# Patient Record
Sex: FEMALE | Race: WHITE | NOT HISPANIC OR LATINO | Employment: OTHER | URBAN - METROPOLITAN AREA
[De-identification: names, ages, dates, MRNs, and addresses within clinical notes are randomized per-mention and may not be internally consistent; named-entity substitution may affect disease eponyms.]

---

## 2023-07-11 LAB
ALANINE AMINOTRANSFERASE (SGPT) (U/L) IN SER/PLAS: 15 U/L (ref 7–45)
ALBUMIN (G/DL) IN SER/PLAS: 4.1 G/DL (ref 3.4–5)
ALKALINE PHOSPHATASE (U/L) IN SER/PLAS: 96 U/L (ref 33–136)
ANION GAP IN SER/PLAS: 9 MMOL/L (ref 10–20)
ASPARTATE AMINOTRANSFERASE (SGOT) (U/L) IN SER/PLAS: 14 U/L (ref 9–39)
BASOPHILS (10*3/UL) IN BLOOD BY AUTOMATED COUNT: 0.03 X10E9/L (ref 0–0.1)
BASOPHILS/100 LEUKOCYTES IN BLOOD BY AUTOMATED COUNT: 0.6 % (ref 0–2)
BILIRUBIN TOTAL (MG/DL) IN SER/PLAS: 0.5 MG/DL (ref 0–1.2)
CALCIUM (MG/DL) IN SER/PLAS: 9.8 MG/DL (ref 8.6–10.3)
CARBON DIOXIDE, TOTAL (MMOL/L) IN SER/PLAS: 27 MMOL/L (ref 21–32)
CHLORIDE (MMOL/L) IN SER/PLAS: 107 MMOL/L (ref 98–107)
CHOLESTEROL (MG/DL) IN SER/PLAS: 186 MG/DL (ref 0–199)
CHOLESTEROL IN HDL (MG/DL) IN SER/PLAS: 61.6 MG/DL
CHOLESTEROL/HDL RATIO: 3
CREATININE (MG/DL) IN SER/PLAS: 0.89 MG/DL (ref 0.5–1.05)
EOSINOPHILS (10*3/UL) IN BLOOD BY AUTOMATED COUNT: 0.22 X10E9/L (ref 0–0.4)
EOSINOPHILS/100 LEUKOCYTES IN BLOOD BY AUTOMATED COUNT: 4.2 % (ref 0–6)
ERYTHROCYTE DISTRIBUTION WIDTH (RATIO) BY AUTOMATED COUNT: 13.2 % (ref 11.5–14.5)
ERYTHROCYTE MEAN CORPUSCULAR HEMOGLOBIN CONCENTRATION (G/DL) BY AUTOMATED: 32.8 G/DL (ref 32–36)
ERYTHROCYTE MEAN CORPUSCULAR VOLUME (FL) BY AUTOMATED COUNT: 96 FL (ref 80–100)
ERYTHROCYTES (10*6/UL) IN BLOOD BY AUTOMATED COUNT: 4.04 X10E12/L (ref 4–5.2)
GFR FEMALE: 69 ML/MIN/1.73M2
GLUCOSE (MG/DL) IN SER/PLAS: 94 MG/DL (ref 74–99)
HEMATOCRIT (%) IN BLOOD BY AUTOMATED COUNT: 38.7 % (ref 36–46)
HEMOGLOBIN (G/DL) IN BLOOD: 12.7 G/DL (ref 12–16)
IMMATURE GRANULOCYTES/100 LEUKOCYTES IN BLOOD BY AUTOMATED COUNT: 0.4 % (ref 0–0.9)
LDL: 114 MG/DL (ref 0–99)
LEUKOCYTES (10*3/UL) IN BLOOD BY AUTOMATED COUNT: 5.3 X10E9/L (ref 4.4–11.3)
LYMPHOCYTES (10*3/UL) IN BLOOD BY AUTOMATED COUNT: 1.72 X10E9/L (ref 0.8–3)
LYMPHOCYTES/100 LEUKOCYTES IN BLOOD BY AUTOMATED COUNT: 32.7 % (ref 13–44)
MONOCYTES (10*3/UL) IN BLOOD BY AUTOMATED COUNT: 0.57 X10E9/L (ref 0.05–0.8)
MONOCYTES/100 LEUKOCYTES IN BLOOD BY AUTOMATED COUNT: 10.8 % (ref 2–10)
NEUTROPHILS (10*3/UL) IN BLOOD BY AUTOMATED COUNT: 2.7 X10E9/L (ref 1.6–5.5)
NEUTROPHILS/100 LEUKOCYTES IN BLOOD BY AUTOMATED COUNT: 51.3 % (ref 40–80)
PLATELETS (10*3/UL) IN BLOOD AUTOMATED COUNT: 176 X10E9/L (ref 150–450)
POTASSIUM (MMOL/L) IN SER/PLAS: 3.7 MMOL/L (ref 3.5–5.3)
PROTEIN TOTAL: 6.8 G/DL (ref 6.4–8.2)
SODIUM (MMOL/L) IN SER/PLAS: 139 MMOL/L (ref 136–145)
THYROTROPIN (MIU/L) IN SER/PLAS BY DETECTION LIMIT <= 0.05 MIU/L: 3.64 MIU/L (ref 0.44–3.98)
TRIGLYCERIDE (MG/DL) IN SER/PLAS: 54 MG/DL (ref 0–149)
UREA NITROGEN (MG/DL) IN SER/PLAS: 20 MG/DL (ref 6–23)
VLDL: 11 MG/DL (ref 0–40)

## 2023-09-12 PROBLEM — I83.90 VARICOSE VEIN OF LEG: Status: ACTIVE | Noted: 2023-09-12

## 2023-09-12 PROBLEM — I10 BENIGN ESSENTIAL HYPERTENSION: Status: ACTIVE | Noted: 2023-09-12

## 2023-09-12 PROBLEM — R26.89 BALANCE PROBLEM: Status: ACTIVE | Noted: 2023-09-12

## 2023-09-12 PROBLEM — F41.0 PANIC ATTACK: Status: ACTIVE | Noted: 2023-09-12

## 2023-09-12 PROBLEM — H69.90 ETD (EUSTACHIAN TUBE DYSFUNCTION): Status: ACTIVE | Noted: 2023-09-12

## 2023-09-12 PROBLEM — F41.9 ANXIETY: Status: ACTIVE | Noted: 2023-09-12

## 2023-09-12 PROBLEM — H90.2: Status: ACTIVE | Noted: 2023-09-12

## 2023-09-12 PROBLEM — I80.9: Status: ACTIVE | Noted: 2023-09-12

## 2023-09-12 PROBLEM — F32.A DEPRESSION: Status: ACTIVE | Noted: 2023-09-12

## 2023-09-12 PROBLEM — E66.01 OBESITY, MORBID (MORE THAN 100 LBS OVER IDEAL WEIGHT OR BMI > 40) (MULTI): Status: ACTIVE | Noted: 2023-09-12

## 2023-09-12 PROBLEM — K21.9 GERD (GASTROESOPHAGEAL REFLUX DISEASE): Status: ACTIVE | Noted: 2023-09-12

## 2023-09-12 PROBLEM — E03.9 HYPOTHYROIDISM: Status: ACTIVE | Noted: 2023-09-12

## 2023-09-12 PROBLEM — E78.5 HYPERLIPEMIA: Status: ACTIVE | Noted: 2023-09-12

## 2023-09-12 PROBLEM — R39.15 URINARY URGENCY: Status: ACTIVE | Noted: 2023-09-12

## 2023-09-12 PROBLEM — R79.89 ABNORMAL TSH: Status: ACTIVE | Noted: 2023-09-12

## 2023-09-12 RX ORDER — POLYETHYLENE GLYCOL 400 AND PROPYLENE GLYCOL 4; 3 MG/ML; MG/ML
SOLUTION/ DROPS OPHTHALMIC
COMMUNITY
End: 2023-10-16 | Stop reason: WASHOUT

## 2023-09-12 RX ORDER — METOPROLOL TARTRATE 50 MG/1
1 TABLET ORAL 2 TIMES DAILY
COMMUNITY
Start: 2022-03-07 | End: 2023-11-07

## 2023-09-12 RX ORDER — LOTEPREDNOL ETABONATE 5 MG/ML
SUSPENSION/ DROPS OPHTHALMIC
COMMUNITY
End: 2023-10-16 | Stop reason: WASHOUT

## 2023-09-12 RX ORDER — LORAZEPAM 0.5 MG/1
0.5 TABLET ORAL AS NEEDED
COMMUNITY

## 2023-09-12 RX ORDER — OMEPRAZOLE 40 MG/1
1 CAPSULE, DELAYED RELEASE ORAL DAILY
COMMUNITY
End: 2023-10-03

## 2023-09-12 RX ORDER — FLUTICASONE PROPIONATE 50 MCG
2 SPRAY, SUSPENSION (ML) NASAL DAILY
COMMUNITY
Start: 2022-10-10 | End: 2024-03-21 | Stop reason: SDUPTHER

## 2023-09-12 RX ORDER — LEVOTHYROXINE SODIUM 25 UG/1
1 TABLET ORAL DAILY
COMMUNITY
Start: 2022-04-20 | End: 2024-03-21 | Stop reason: WASHOUT

## 2023-09-12 RX ORDER — AMLODIPINE BESYLATE 5 MG/1
1 TABLET ORAL 2 TIMES DAILY
COMMUNITY
Start: 2022-04-12 | End: 2023-12-11

## 2023-09-12 RX ORDER — SPIRONOLACTONE 25 MG/1
1 TABLET ORAL DAILY
COMMUNITY
Start: 2022-04-15 | End: 2023-10-16 | Stop reason: WASHOUT

## 2023-09-12 RX ORDER — MONTELUKAST SODIUM 10 MG/1
10 TABLET ORAL DAILY
COMMUNITY
Start: 2022-10-31 | End: 2023-11-01
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
## 2023-09-13 PROBLEM — Z87.898 HISTORY OF PALPITATIONS: Status: ACTIVE | Noted: 2023-09-13

## 2023-10-16 ENCOUNTER — OFFICE VISIT (OUTPATIENT)
Dept: PRIMARY CARE | Facility: CLINIC | Age: 73
End: 2023-10-16
Payer: MEDICARE

## 2023-10-16 VITALS
DIASTOLIC BLOOD PRESSURE: 78 MMHG | WEIGHT: 279.2 LBS | SYSTOLIC BLOOD PRESSURE: 131 MMHG | HEIGHT: 66 IN | HEART RATE: 73 BPM | BODY MASS INDEX: 44.87 KG/M2 | TEMPERATURE: 95.7 F

## 2023-10-16 DIAGNOSIS — K21.9 GASTROESOPHAGEAL REFLUX DISEASE WITHOUT ESOPHAGITIS: ICD-10-CM

## 2023-10-16 DIAGNOSIS — Z96.1 STATUS POST CATARACT EXTRACTION AND INSERTION OF INTRAOCULAR LENS OF RIGHT EYE: ICD-10-CM

## 2023-10-16 DIAGNOSIS — Z96.1 STATUS POST CATARACT EXTRACTION AND INSERTION OF INTRAOCULAR LENS OF LEFT EYE: ICD-10-CM

## 2023-10-16 DIAGNOSIS — Z98.42 STATUS POST CATARACT EXTRACTION AND INSERTION OF INTRAOCULAR LENS OF LEFT EYE: ICD-10-CM

## 2023-10-16 DIAGNOSIS — I10 ESSENTIAL HYPERTENSION: Primary | ICD-10-CM

## 2023-10-16 DIAGNOSIS — Z98.41 STATUS POST CATARACT EXTRACTION AND INSERTION OF INTRAOCULAR LENS OF RIGHT EYE: ICD-10-CM

## 2023-10-16 DIAGNOSIS — E03.9 ACQUIRED HYPOTHYROIDISM: ICD-10-CM

## 2023-10-16 DIAGNOSIS — I87.8 VENOUS STASIS: ICD-10-CM

## 2023-10-16 PROBLEM — H00.022 HORDEOLUM INTERNUM OF RIGHT LOWER EYELID: Status: ACTIVE | Noted: 2022-12-27

## 2023-10-16 PROBLEM — H52.221 REGULAR ASTIGMATISM OF RIGHT EYE: Status: ACTIVE | Noted: 2022-10-26

## 2023-10-16 PROBLEM — H52.222 REGULAR ASTIGMATISM OF LEFT EYE: Status: ACTIVE | Noted: 2022-10-26

## 2023-10-16 PROCEDURE — 3078F DIAST BP <80 MM HG: CPT | Performed by: INTERNAL MEDICINE

## 2023-10-16 PROCEDURE — 90662 IIV NO PRSV INCREASED AG IM: CPT | Performed by: INTERNAL MEDICINE

## 2023-10-16 PROCEDURE — 99214 OFFICE O/P EST MOD 30 MIN: CPT | Performed by: INTERNAL MEDICINE

## 2023-10-16 PROCEDURE — 1159F MED LIST DOCD IN RCRD: CPT | Performed by: INTERNAL MEDICINE

## 2023-10-16 PROCEDURE — 1036F TOBACCO NON-USER: CPT | Performed by: INTERNAL MEDICINE

## 2023-10-16 PROCEDURE — G0008 ADMIN INFLUENZA VIRUS VAC: HCPCS | Performed by: INTERNAL MEDICINE

## 2023-10-16 PROCEDURE — 3008F BODY MASS INDEX DOCD: CPT | Performed by: INTERNAL MEDICINE

## 2023-10-16 PROCEDURE — 3075F SYST BP GE 130 - 139MM HG: CPT | Performed by: INTERNAL MEDICINE

## 2023-10-16 ASSESSMENT — PATIENT HEALTH QUESTIONNAIRE - PHQ9
2. FEELING DOWN, DEPRESSED OR HOPELESS: NOT AT ALL
1. LITTLE INTEREST OR PLEASURE IN DOING THINGS: NOT AT ALL
SUM OF ALL RESPONSES TO PHQ9 QUESTIONS 1 AND 2: 0

## 2023-10-16 NOTE — PROGRESS NOTES
Assessment/Plan   Problem List Items Addressed This Visit       GERD (gastroesophageal reflux disease)    Hypothyroidism    Relevant Orders    TSH with reflex to Free T4 if abnormal    Thyroid Peroxidase (TPO) Antibody    Thyroglobulin and Antithyroglobulin    Status post cataract extraction and insertion of intraocular lens of left eye    Status post cataract extraction and insertion of intraocular lens of right eye    Essential hypertension - Primary     Other Visit Diagnoses       Venous stasis        Relevant Orders    Referral to Vascular Surgery        After full discussion we agreed to to stop levothyroxine altogether we will do the blood work in about 3-month and follow-up on that as she is not tolerating levothyroxine open 25 mcg  For venous stasis advised to see Dr. Whelan who may evaluate the venous status and advise accordingly in the meantime leg elevation was also advised  Subjective   Patient ID: Deidre Santos is a 72 y.o. female who presents for Follow-up (3 months.  ).    Past Surgical History:   Procedure Laterality Date    OTHER SURGICAL HISTORY  04/12/2022    Hysterectomy      Family History   Problem Relation Name Age of Onset    Breast cancer Mother      Aortic aneurysm Father      Cancer Father      Breast cancer Mother's Sister      Prostate cancer Sibling        Social History     Socioeconomic History    Marital status:      Spouse name: Not on file    Number of children: Not on file    Years of education: Not on file    Highest education level: Not on file   Occupational History    Not on file   Tobacco Use    Smoking status: Never    Smokeless tobacco: Never   Substance and Sexual Activity    Alcohol use: Yes     Comment: 3 beers monthly    Drug use: Never    Sexual activity: Not on file   Other Topics Concern    Not on file   Social History Narrative    Not on file     Social Determinants of Health     Financial Resource Strain: Not on file   Food Insecurity: Not on file  "  Transportation Needs: Not on file   Physical Activity: Not on file   Stress: Not on file   Social Connections: Not on file   Intimate Partner Violence: Not on file   Housing Stability: Not on file      Patient has no known allergies.   Current Outpatient Medications   Medication Sig Dispense Refill    amLODIPine (Norvasc) 5 mg tablet Take 1 tablet (5 mg) by mouth 2 times a day.      fluticasone (Flonase) 50 mcg/actuation nasal spray Administer 2 sprays into affected nostril(s) once daily.      levothyroxine (Synthroid, Levoxyl) 25 mcg tablet Take 1 tablet (25 mcg) by mouth once daily.      LORazepam (Ativan) 0.5 mg tablet Take 1 tablet (0.5 mg) by mouth if needed.      metoprolol tartrate (Lopressor) 50 mg tablet Take 1 tablet by mouth 2 times a day.      montelukast (Singulair) 10 mg tablet Take 1 tablet (10 mg) by mouth once daily. Appointment needed before next refill.      omeprazole (PriLOSEC) 40 mg DR capsule TAKE 1 CAPSULE BY MOUTH EVERY DAY 90 capsule 0     No current facility-administered medications for this visit.      Vitals:    10/16/23 1137   BP: 131/78   BP Location: Right arm   Patient Position: Sitting   Pulse: 73   Temp: 35.4 °C (95.7 °F)   Weight: 127 kg (279 lb 3.2 oz)   Height: 1.676 m (5' 6\")      Problem List Items Addressed This Visit       GERD (gastroesophageal reflux disease)    Hypothyroidism    Relevant Orders    TSH with reflex to Free T4 if abnormal    Thyroid Peroxidase (TPO) Antibody    Thyroglobulin and Antithyroglobulin    Status post cataract extraction and insertion of intraocular lens of left eye    Status post cataract extraction and insertion of intraocular lens of right eye    Essential hypertension - Primary     Other Visit Diagnoses       Venous stasis        Relevant Orders    Referral to Vascular Surgery           Orders Placed This Encounter   Procedures    Flu vaccine, quadrivalent, high-dose, preservative free, age 65y+ (FLUZONE)    TSH with reflex to Free T4 if " "abnormal     Standing Status:   Future     Standing Expiration Date:   10/16/2024     Order Specific Question:   Release result to MyChart     Answer:   Immediate    Thyroid Peroxidase (TPO) Antibody     Standing Status:   Future     Standing Expiration Date:   10/16/2024     Order Specific Question:   Release result to MyChart     Answer:   Immediate [1]    Thyroglobulin and Antithyroglobulin     Standing Status:   Future     Standing Expiration Date:   10/16/2024     Order Specific Question:   Release result to MyChart     Answer:   Immediate [1]    Referral to Vascular Surgery     Standing Status:   Future     Standing Expiration Date:   4/16/2024     Referral Priority:   Routine     Referral Type:   Consultation     Referral Reason:   Specialty Services Required     Requested Specialty:   Vascular Surgery     Number of Visits Requested:   1        HPI  Came for 2 reasons 1 is that she is having leg swelling and getting worse but it is not new problem  She has no chest pain no shortness of breath  She could not tolerate levothyroxine when she does not take she feels well    ROS as above otherwise    PHYSICAL EXAM heart sounds regular chest clear abdomen soft nontender neuro awake alert  Venous stasis edema is noted      No results found for: \"PR1\", \"BMPR1A\", \"CMPLAS\", \"IB6GAQJQ\", \"KPSAT\"   Lab Results   Component Value Date    CHOL 186 07/11/2023    CHHDL 3.0 07/11/2023                "

## 2023-11-01 DIAGNOSIS — J30.9 ALLERGIC RHINITIS, UNSPECIFIED: ICD-10-CM

## 2023-11-01 RX ORDER — MONTELUKAST SODIUM 10 MG/1
TABLET ORAL
Qty: 90 TABLET | Refills: 0 | Status: SHIPPED | OUTPATIENT
Start: 2023-11-01 | End: 2023-12-21 | Stop reason: SDUPTHER

## 2023-11-05 DIAGNOSIS — I10 ESSENTIAL (PRIMARY) HYPERTENSION: ICD-10-CM

## 2023-11-07 RX ORDER — METOPROLOL TARTRATE 50 MG/1
50 TABLET ORAL 2 TIMES DAILY
Qty: 180 TABLET | Refills: 0 | Status: SHIPPED | OUTPATIENT
Start: 2023-11-07 | End: 2023-12-21 | Stop reason: SDUPTHER

## 2023-12-10 DIAGNOSIS — I10 ESSENTIAL (PRIMARY) HYPERTENSION: ICD-10-CM

## 2023-12-11 RX ORDER — AMLODIPINE BESYLATE 5 MG/1
5 TABLET ORAL 2 TIMES DAILY
Qty: 180 TABLET | Refills: 0 | Status: SHIPPED | OUTPATIENT
Start: 2023-12-11 | End: 2023-12-21 | Stop reason: SDUPTHER

## 2023-12-12 NOTE — TELEPHONE ENCOUNTER
Patient scheduled. Patient would also like to know if she needs to get blood work done prior to appt on 12/21/23

## 2023-12-13 ENCOUNTER — LAB (OUTPATIENT)
Dept: LAB | Facility: LAB | Age: 73
End: 2023-12-13
Payer: MEDICARE

## 2023-12-13 DIAGNOSIS — E03.9 ACQUIRED HYPOTHYROIDISM: ICD-10-CM

## 2023-12-13 LAB
THYROPEROXIDASE AB SERPL-ACNC: 121 IU/ML
TSH SERPL-ACNC: 3.11 MIU/L (ref 0.44–3.98)

## 2023-12-13 PROCEDURE — 86376 MICROSOMAL ANTIBODY EACH: CPT

## 2023-12-13 PROCEDURE — 86800 THYROGLOBULIN ANTIBODY: CPT

## 2023-12-13 PROCEDURE — 36415 COLL VENOUS BLD VENIPUNCTURE: CPT

## 2023-12-13 PROCEDURE — 84443 ASSAY THYROID STIM HORMONE: CPT

## 2023-12-13 PROCEDURE — 84432 ASSAY OF THYROGLOBULIN: CPT

## 2023-12-15 LAB
BILL ONLY-THYROGLOBULIN: NORMAL
THYROGLOB AB SERPL-ACNC: <0.9 IU/ML (ref 0–4)
THYROGLOB SERPL-MCNC: 19.8 NG/ML (ref 1.3–31.8)
THYROGLOB SERPL-MCNC: NORMAL NG/ML (ref 1.3–31.8)

## 2023-12-21 ENCOUNTER — OFFICE VISIT (OUTPATIENT)
Dept: PRIMARY CARE | Facility: CLINIC | Age: 73
End: 2023-12-21
Payer: MEDICARE

## 2023-12-21 VITALS
BODY MASS INDEX: 45.32 KG/M2 | HEART RATE: 61 BPM | DIASTOLIC BLOOD PRESSURE: 84 MMHG | TEMPERATURE: 97.9 F | HEIGHT: 66 IN | WEIGHT: 282 LBS | SYSTOLIC BLOOD PRESSURE: 143 MMHG

## 2023-12-21 DIAGNOSIS — K21.9 GASTROESOPHAGEAL REFLUX DISEASE WITHOUT ESOPHAGITIS: ICD-10-CM

## 2023-12-21 DIAGNOSIS — I10 ESSENTIAL (PRIMARY) HYPERTENSION: ICD-10-CM

## 2023-12-21 DIAGNOSIS — J30.9 ALLERGIC RHINITIS, UNSPECIFIED: ICD-10-CM

## 2023-12-21 DIAGNOSIS — I47.10 SVT (SUPRAVENTRICULAR TACHYCARDIA) (CMS-HCC): ICD-10-CM

## 2023-12-21 DIAGNOSIS — E78.5 HYPERLIPIDEMIA, UNSPECIFIED HYPERLIPIDEMIA TYPE: ICD-10-CM

## 2023-12-21 DIAGNOSIS — E03.9 ACQUIRED HYPOTHYROIDISM: ICD-10-CM

## 2023-12-21 DIAGNOSIS — J30.9 ALLERGIC RHINITIS, UNSPECIFIED SEASONALITY, UNSPECIFIED TRIGGER: ICD-10-CM

## 2023-12-21 DIAGNOSIS — I10 BENIGN ESSENTIAL HYPERTENSION: Primary | ICD-10-CM

## 2023-12-21 PROCEDURE — 3008F BODY MASS INDEX DOCD: CPT | Performed by: INTERNAL MEDICINE

## 2023-12-21 PROCEDURE — 3077F SYST BP >= 140 MM HG: CPT | Performed by: INTERNAL MEDICINE

## 2023-12-21 PROCEDURE — 1159F MED LIST DOCD IN RCRD: CPT | Performed by: INTERNAL MEDICINE

## 2023-12-21 PROCEDURE — 1036F TOBACCO NON-USER: CPT | Performed by: INTERNAL MEDICINE

## 2023-12-21 PROCEDURE — 99214 OFFICE O/P EST MOD 30 MIN: CPT | Performed by: INTERNAL MEDICINE

## 2023-12-21 PROCEDURE — 3079F DIAST BP 80-89 MM HG: CPT | Performed by: INTERNAL MEDICINE

## 2023-12-21 RX ORDER — METOPROLOL TARTRATE 50 MG/1
50 TABLET ORAL 2 TIMES DAILY
Qty: 180 TABLET | Refills: 0 | Status: SHIPPED | OUTPATIENT
Start: 2023-12-21 | End: 2024-05-13

## 2023-12-21 RX ORDER — MONTELUKAST SODIUM 10 MG/1
TABLET ORAL
Qty: 90 TABLET | Refills: 1 | Status: SHIPPED | OUTPATIENT
Start: 2023-12-21

## 2023-12-21 RX ORDER — AMLODIPINE BESYLATE 5 MG/1
5 TABLET ORAL 2 TIMES DAILY
Qty: 180 TABLET | Refills: 0 | Status: SHIPPED | OUTPATIENT
Start: 2023-12-21

## 2023-12-21 ASSESSMENT — PATIENT HEALTH QUESTIONNAIRE - PHQ9
SUM OF ALL RESPONSES TO PHQ9 QUESTIONS 1 AND 2: 0
2. FEELING DOWN, DEPRESSED OR HOPELESS: NOT AT ALL
1. LITTLE INTEREST OR PLEASURE IN DOING THINGS: NOT AT ALL

## 2023-12-21 NOTE — PROGRESS NOTES
Assessment/Plan   Problem List Items Addressed This Visit       Benign essential hypertension - Primary    GERD (gastroesophageal reflux disease)    Hyperlipemia    Hypothyroidism    Essential (primary) hypertension    Relevant Medications    metoprolol tartrate (Lopressor) 50 mg tablet    amLODIPine (Norvasc) 5 mg tablet    Allergic rhinitis, unspecified    Relevant Medications    montelukast (Singulair) 10 mg tablet    SVT (supraventricular tachycardia)    Relevant Medications    metoprolol tartrate (Lopressor) 50 mg tablet    amLODIPine (Norvasc) 5 mg tablet   She described that all her symptoms resolved after stopping levothyroxine which was in a small dose  She has Hashimoto's thyroiditis with positive antibodies and borderline elevated TSH she did not tolerate the thyroid medicine as such  Holter monitoring had shown 5 beats SVT during that.  We will continue to evaluate and repeat blood test in 6 months time or sooner if we consider so depending upon the symptoms and therefore she will be seen in 3 months  Other conditions and current medications reviewed discussed    Subjective   Patient ID: Deidre Santos is a 73 y.o. female who presents for Med Refill.    Past Surgical History:   Procedure Laterality Date    OTHER SURGICAL HISTORY  04/12/2022    Hysterectomy      Family History   Problem Relation Name Age of Onset    Breast cancer Mother      Aortic aneurysm Father      Cancer Father      Breast cancer Mother's Sister      Prostate cancer Sibling        Social History     Socioeconomic History    Marital status:      Spouse name: Not on file    Number of children: Not on file    Years of education: Not on file    Highest education level: Not on file   Occupational History    Not on file   Tobacco Use    Smoking status: Never    Smokeless tobacco: Never   Substance and Sexual Activity    Alcohol use: Yes     Comment: 3 beers monthly    Drug use: Never    Sexual activity: Not on file   Other Topics  "Concern    Not on file   Social History Narrative    Not on file     Social Determinants of Health     Financial Resource Strain: Not on file   Food Insecurity: Not on file   Transportation Needs: Not on file   Physical Activity: Not on file   Stress: Not on file   Social Connections: Not on file   Intimate Partner Violence: Not on file   Housing Stability: Not on file      Patient has no known allergies.   Current Outpatient Medications   Medication Sig Dispense Refill    fluticasone (Flonase) 50 mcg/actuation nasal spray Administer 2 sprays into affected nostril(s) once daily.      levothyroxine (Synthroid, Levoxyl) 25 mcg tablet Take 1 tablet (25 mcg) by mouth once daily.      LORazepam (Ativan) 0.5 mg tablet Take 1 tablet (0.5 mg) by mouth if needed.      omeprazole (PriLOSEC) 40 mg DR capsule TAKE 1 CAPSULE BY MOUTH EVERY DAY 90 capsule 0    amLODIPine (Norvasc) 5 mg tablet Take 1 tablet (5 mg) by mouth 2 times a day. 180 tablet 0    metoprolol tartrate (Lopressor) 50 mg tablet Take 1 tablet by mouth 2 times a day. 180 tablet 0    montelukast (Singulair) 10 mg tablet TAKE ONE TABLET BY MOUTH DAILY. APPOINTMENT NEEDED BEFORE NEXT REFILL 90 tablet 1     No current facility-administered medications for this visit.      Vitals:    12/21/23 1321   BP: 143/84   Pulse: 61   Temp: 36.6 °C (97.9 °F)   Weight: 128 kg (282 lb)   Height: 1.676 m (5' 6\")      Problem List Items Addressed This Visit       Benign essential hypertension - Primary    GERD (gastroesophageal reflux disease)    Hyperlipemia    Hypothyroidism    Essential (primary) hypertension    Relevant Medications    metoprolol tartrate (Lopressor) 50 mg tablet    amLODIPine (Norvasc) 5 mg tablet    Allergic rhinitis, unspecified    Relevant Medications    montelukast (Singulair) 10 mg tablet    SVT (supraventricular tachycardia)    Relevant Medications    metoprolol tartrate (Lopressor) 50 mg tablet    amLODIPine (Norvasc) 5 mg tablet      No orders of the " "defined types were placed in this encounter.       HPI  This 73-year-old pleasant female who was on a small dose of levothyroxine the dose was cut down initially and then eventually it was a stopped because she was having tremor and will feeling and eventually Holter monitor showed 5 beats SVT  She has stopped taking levothyroxine and she feels that back to normal  There is no symptoms referable to any system      ROS  Negative  Past medical history reviewed  Social and family history reviewed  Allergies and medications reviewed  Recent labs reviewed  Vital signs reviewed  PHYSICAL EXAM  Normal exam    No results found for: \"PR1\", \"BMPR1A\", \"CMPLAS\", \"[id number]\", \"KPSAT\"   Lab Results   Component Value Date    CHOL 186 07/11/2023    CHHDL 3.0 07/11/2023                "

## 2024-03-21 ENCOUNTER — OFFICE VISIT (OUTPATIENT)
Dept: PRIMARY CARE | Facility: CLINIC | Age: 74
End: 2024-03-21
Payer: MEDICARE

## 2024-03-21 VITALS
BODY MASS INDEX: 46.38 KG/M2 | TEMPERATURE: 97 F | DIASTOLIC BLOOD PRESSURE: 84 MMHG | HEART RATE: 62 BPM | SYSTOLIC BLOOD PRESSURE: 143 MMHG | HEIGHT: 66 IN | WEIGHT: 288.6 LBS

## 2024-03-21 DIAGNOSIS — K21.9 GASTROESOPHAGEAL REFLUX DISEASE WITHOUT ESOPHAGITIS: ICD-10-CM

## 2024-03-21 DIAGNOSIS — E78.5 HYPERLIPIDEMIA, UNSPECIFIED HYPERLIPIDEMIA TYPE: ICD-10-CM

## 2024-03-21 DIAGNOSIS — E06.3 HYPOTHYROIDISM DUE TO HASHIMOTO'S THYROIDITIS: ICD-10-CM

## 2024-03-21 DIAGNOSIS — Z00.00 WELL ADULT HEALTH CHECK: ICD-10-CM

## 2024-03-21 DIAGNOSIS — E03.8 HYPOTHYROIDISM DUE TO HASHIMOTO'S THYROIDITIS: ICD-10-CM

## 2024-03-21 DIAGNOSIS — Z00.00 ROUTINE GENERAL MEDICAL EXAMINATION AT HEALTH CARE FACILITY: Primary | ICD-10-CM

## 2024-03-21 DIAGNOSIS — F41.9 ANXIETY: ICD-10-CM

## 2024-03-21 DIAGNOSIS — R26.9 GAIT ABNORMALITY: ICD-10-CM

## 2024-03-21 DIAGNOSIS — J30.9 ALLERGIC RHINITIS, UNSPECIFIED SEASONALITY, UNSPECIFIED TRIGGER: ICD-10-CM

## 2024-03-21 DIAGNOSIS — I10 BENIGN ESSENTIAL HYPERTENSION: ICD-10-CM

## 2024-03-21 DIAGNOSIS — Z12.31 ENCOUNTER FOR SCREENING MAMMOGRAM FOR MALIGNANT NEOPLASM OF BREAST: ICD-10-CM

## 2024-03-21 DIAGNOSIS — R29.898 COGWHEEL RIGIDITY: ICD-10-CM

## 2024-03-21 PROCEDURE — G0439 PPPS, SUBSEQ VISIT: HCPCS | Performed by: INTERNAL MEDICINE

## 2024-03-21 PROCEDURE — 1170F FXNL STATUS ASSESSED: CPT | Performed by: INTERNAL MEDICINE

## 2024-03-21 PROCEDURE — 3079F DIAST BP 80-89 MM HG: CPT | Performed by: INTERNAL MEDICINE

## 2024-03-21 PROCEDURE — 3077F SYST BP >= 140 MM HG: CPT | Performed by: INTERNAL MEDICINE

## 2024-03-21 PROCEDURE — 1160F RVW MEDS BY RX/DR IN RCRD: CPT | Performed by: INTERNAL MEDICINE

## 2024-03-21 PROCEDURE — 1159F MED LIST DOCD IN RCRD: CPT | Performed by: INTERNAL MEDICINE

## 2024-03-21 PROCEDURE — 1036F TOBACCO NON-USER: CPT | Performed by: INTERNAL MEDICINE

## 2024-03-21 RX ORDER — FLUTICASONE PROPIONATE 50 MCG
2 SPRAY, SUSPENSION (ML) NASAL DAILY
Qty: 16 G | Refills: 3 | Status: SHIPPED | OUTPATIENT
Start: 2024-03-21

## 2024-03-21 ASSESSMENT — ACTIVITIES OF DAILY LIVING (ADL)
DOING_HOUSEWORK: INDEPENDENT
GROCERY_SHOPPING: INDEPENDENT
BATHING: INDEPENDENT
DRESSING: INDEPENDENT
MANAGING_FINANCES: INDEPENDENT
TAKING_MEDICATION: INDEPENDENT

## 2024-03-21 ASSESSMENT — PATIENT HEALTH QUESTIONNAIRE - PHQ9
1. LITTLE INTEREST OR PLEASURE IN DOING THINGS: NOT AT ALL
2. FEELING DOWN, DEPRESSED OR HOPELESS: NOT AT ALL
SUM OF ALL RESPONSES TO PHQ9 QUESTIONS 1 AND 2: 0

## 2024-04-09 ENCOUNTER — OFFICE VISIT (OUTPATIENT)
Dept: PRIMARY CARE | Facility: CLINIC | Age: 74
End: 2024-04-09
Payer: MEDICARE

## 2024-04-09 ENCOUNTER — APPOINTMENT (OUTPATIENT)
Dept: RADIOLOGY | Facility: HOSPITAL | Age: 74
End: 2024-04-09
Payer: MEDICARE

## 2024-04-09 VITALS
BODY MASS INDEX: 45.77 KG/M2 | HEART RATE: 84 BPM | DIASTOLIC BLOOD PRESSURE: 67 MMHG | HEIGHT: 66 IN | WEIGHT: 284.8 LBS | SYSTOLIC BLOOD PRESSURE: 117 MMHG | TEMPERATURE: 97 F

## 2024-04-09 DIAGNOSIS — I10 BENIGN ESSENTIAL HYPERTENSION: ICD-10-CM

## 2024-04-09 DIAGNOSIS — J30.9 ALLERGIC RHINITIS, UNSPECIFIED SEASONALITY, UNSPECIFIED TRIGGER: ICD-10-CM

## 2024-04-09 DIAGNOSIS — R21 MACULOPAPULAR RASH, GENERALIZED: Primary | ICD-10-CM

## 2024-04-09 PROCEDURE — 3078F DIAST BP <80 MM HG: CPT | Performed by: INTERNAL MEDICINE

## 2024-04-09 PROCEDURE — 3074F SYST BP LT 130 MM HG: CPT | Performed by: INTERNAL MEDICINE

## 2024-04-09 PROCEDURE — 96372 THER/PROPH/DIAG INJ SC/IM: CPT | Performed by: INTERNAL MEDICINE

## 2024-04-09 PROCEDURE — 1159F MED LIST DOCD IN RCRD: CPT | Performed by: INTERNAL MEDICINE

## 2024-04-09 PROCEDURE — 99214 OFFICE O/P EST MOD 30 MIN: CPT | Performed by: INTERNAL MEDICINE

## 2024-04-09 PROCEDURE — 1036F TOBACCO NON-USER: CPT | Performed by: INTERNAL MEDICINE

## 2024-04-09 PROCEDURE — 1160F RVW MEDS BY RX/DR IN RCRD: CPT | Performed by: INTERNAL MEDICINE

## 2024-04-09 RX ORDER — PREDNISONE 10 MG/1
TABLET ORAL
Qty: 20 TABLET | Refills: 0 | Status: SHIPPED | OUTPATIENT
Start: 2024-04-09

## 2024-04-09 RX ORDER — LORATADINE 10 MG/1
10 TABLET ORAL DAILY
Qty: 10 TABLET | Refills: 0 | Status: SHIPPED | OUTPATIENT
Start: 2024-04-09 | End: 2024-04-19

## 2024-04-09 ASSESSMENT — PATIENT HEALTH QUESTIONNAIRE - PHQ9
2. FEELING DOWN, DEPRESSED OR HOPELESS: NOT AT ALL
SUM OF ALL RESPONSES TO PHQ9 QUESTIONS 1 AND 2: 0
1. LITTLE INTEREST OR PLEASURE IN DOING THINGS: NOT AT ALL

## 2024-04-09 NOTE — PROGRESS NOTES
Assessment/Plan   Problem List Items Addressed This Visit       Benign essential hypertension    Allergic rhinitis, unspecified    Maculopapular rash, generalized - Primary    Relevant Medications    predniSONE (Deltasone) 10 mg tablet    loratadine (Claritin) 10 mg tablet   #1 maculopapular rash generalized cause uncertain  It is started from Easter Sunday and may well be connected with food intake  Other viral exanthemata or mosquito bite  Steroid antihistamine discussed  Will give 40 mg of Solu-Medrol and then oral steroid and loratadine  Anticipate complete resolution if not then follow-up again  #2 allergic rhinitis continue current treatment  #3 benign essential hypertension under control    Subjective   Patient ID: Deidre Santos is a 73 y.o. female who presents for Rash (Patient c/o rash all over her body.  States that she got a little bump on her leg on Easter Sunday.  Now it has spread all over her body.  States that it is extremely itchy and burning.  ).    Past Surgical History:   Procedure Laterality Date    OTHER SURGICAL HISTORY  04/12/2022    Hysterectomy      Family History   Problem Relation Name Age of Onset    Breast cancer Mother      Aortic aneurysm Father      Cancer Father      Parkinsonism Brother      Breast cancer Mother's Sister      Prostate cancer Sibling        Social History     Socioeconomic History    Marital status:      Spouse name: Not on file    Number of children: Not on file    Years of education: Not on file    Highest education level: Not on file   Occupational History    Not on file   Tobacco Use    Smoking status: Never    Smokeless tobacco: Never   Substance and Sexual Activity    Alcohol use: Yes     Comment: 3 beers monthly    Drug use: Never    Sexual activity: Not on file   Other Topics Concern    Not on file   Social History Narrative    Not on file     Social Determinants of Health     Financial Resource Strain: Not on file   Food Insecurity: Not on file  "  Transportation Needs: Not on file   Physical Activity: Not on file   Stress: Not on file   Social Connections: Not on file   Intimate Partner Violence: Not on file   Housing Stability: Not on file      Patient has no known allergies.   Current Outpatient Medications   Medication Sig Dispense Refill    amLODIPine (Norvasc) 5 mg tablet Take 1 tablet (5 mg) by mouth 2 times a day. 180 tablet 0    fluticasone (Flonase) 50 mcg/actuation nasal spray Administer 2 sprays into each nostril once daily. 16 g 3    LORazepam (Ativan) 0.5 mg tablet Take 1 tablet (0.5 mg) by mouth if needed.      metoprolol tartrate (Lopressor) 50 mg tablet Take 1 tablet by mouth 2 times a day. 180 tablet 0    montelukast (Singulair) 10 mg tablet TAKE ONE TABLET BY MOUTH DAILY. APPOINTMENT NEEDED BEFORE NEXT REFILL 90 tablet 1    omeprazole (PriLOSEC) 40 mg DR capsule TAKE 1 CAPSULE BY MOUTH EVERY DAY 90 capsule 0    loratadine (Claritin) 10 mg tablet Take 1 tablet (10 mg) by mouth once daily for 10 days. 10 tablet 0    predniSONE (Deltasone) 10 mg tablet 3 daily bfor 3 days then 2 tabs daily for 3 days then 1 tab daily for 5days 20 tablet 0     No current facility-administered medications for this visit.      Vitals:    04/09/24 1344   BP: 117/67   BP Location: Left arm   Patient Position: Sitting   Pulse: 84   Temp: 36.1 °C (97 °F)   Weight: 129 kg (284 lb 12.8 oz)   Height: 1.676 m (5' 6\")      Problem List Items Addressed This Visit       Benign essential hypertension    Allergic rhinitis, unspecified    Maculopapular rash, generalized - Primary    Relevant Medications    predniSONE (Deltasone) 10 mg tablet    loratadine (Claritin) 10 mg tablet      No orders of the defined types were placed in this encounter.       HPI  Presented today with generalized skin rash which is present all over the body started with the leg going all the the trunk and upper extremity and is still very itchy and giving a discomfort could not sleep well  She used " "some lotion to help with  She is already on montelukast for allergic rhinitis  No chest pain no palpitation no nausea vomiting diarrhea    ROS  Systemic review negative  Past medical history reviewed  Social and family history reviewed  Allergies and medications reviewed  Recent labs reviewed  Vital signs reviewed  PHYSICAL EXAM  Examination of the skin shows maculopapular rash in the lower extremity which is erythematous and pruritic and also in the trunk and upper extremity going up to the neck  Rest of the examination is normal  She is awake alert orientated no distress      No results found for: \"PR1\", \"BMPR1A\", \"CMPLAS\", \"[id number]\", \"KPSAT\"   Lab Results   Component Value Date    CHOL 186 07/11/2023    CHHDL 3.0 07/11/2023                "

## 2024-05-12 DIAGNOSIS — I10 ESSENTIAL (PRIMARY) HYPERTENSION: ICD-10-CM

## 2024-05-13 RX ORDER — METOPROLOL TARTRATE 50 MG/1
50 TABLET ORAL 2 TIMES DAILY
Qty: 180 TABLET | Refills: 1 | Status: SHIPPED | OUTPATIENT
Start: 2024-05-13

## 2024-06-10 DIAGNOSIS — I10 ESSENTIAL (PRIMARY) HYPERTENSION: ICD-10-CM

## 2024-06-12 RX ORDER — AMLODIPINE BESYLATE 5 MG/1
5 TABLET ORAL 2 TIMES DAILY
Qty: 180 TABLET | Refills: 0 | Status: SHIPPED | OUTPATIENT
Start: 2024-06-12

## 2024-07-29 ENCOUNTER — APPOINTMENT (OUTPATIENT)
Dept: NEUROLOGY | Facility: CLINIC | Age: 74
End: 2024-07-29
Payer: MEDICARE

## 2024-07-29 VITALS
WEIGHT: 286.6 LBS | SYSTOLIC BLOOD PRESSURE: 138 MMHG | BODY MASS INDEX: 46.06 KG/M2 | HEART RATE: 76 BPM | DIASTOLIC BLOOD PRESSURE: 82 MMHG | HEIGHT: 66 IN

## 2024-07-29 DIAGNOSIS — R29.898 RUE WEAKNESS: ICD-10-CM

## 2024-07-29 DIAGNOSIS — R29.898 COGWHEEL RIGIDITY: ICD-10-CM

## 2024-07-29 DIAGNOSIS — J30.9 ALLERGIC RHINITIS, UNSPECIFIED SEASONALITY, UNSPECIFIED TRIGGER: ICD-10-CM

## 2024-07-29 DIAGNOSIS — J30.9 ALLERGIC RHINITIS, UNSPECIFIED: ICD-10-CM

## 2024-07-29 DIAGNOSIS — R26.9 GAIT ABNORMALITY: ICD-10-CM

## 2024-07-29 DIAGNOSIS — G20.C PARKINSONISM, UNSPECIFIED PARKINSONISM TYPE (MULTI): Primary | ICD-10-CM

## 2024-07-29 PROCEDURE — 1160F RVW MEDS BY RX/DR IN RCRD: CPT | Performed by: PSYCHIATRY & NEUROLOGY

## 2024-07-29 PROCEDURE — 3075F SYST BP GE 130 - 139MM HG: CPT | Performed by: PSYCHIATRY & NEUROLOGY

## 2024-07-29 PROCEDURE — 1159F MED LIST DOCD IN RCRD: CPT | Performed by: PSYCHIATRY & NEUROLOGY

## 2024-07-29 PROCEDURE — 3079F DIAST BP 80-89 MM HG: CPT | Performed by: PSYCHIATRY & NEUROLOGY

## 2024-07-29 PROCEDURE — 1036F TOBACCO NON-USER: CPT | Performed by: PSYCHIATRY & NEUROLOGY

## 2024-07-29 PROCEDURE — 3008F BODY MASS INDEX DOCD: CPT | Performed by: PSYCHIATRY & NEUROLOGY

## 2024-07-29 PROCEDURE — 99205 OFFICE O/P NEW HI 60 MIN: CPT | Performed by: PSYCHIATRY & NEUROLOGY

## 2024-07-29 ASSESSMENT — UNIFIED PARKINSONS DISEASE RATING SCALE (UPDRS)
TOTAL_SCORE: 23
AMPLITUDE_LLE: 1
PARKINSONS_MEDS: NO
POSTURE: 2
TOETAPPING_LEFT: 2
RIGIDITY_NECK: 0
AMPLITUDE_RLE: 0
FACIAL_EXPRESSION: 0
RIGIDITY_RUE: 0
KINETIC_TREMOR_LEFTHAND: 1
POSTURAL_TREMOR_LEFTHAND: 0
KINETIC_TREMOR_RIGHTHAND: 0
POSTURAL_TREMOR_RIGHTHAND: 0
RIGIDITY_LUE: 1
HANDMOVEMENTS_RIGHT: 0
FINGER_TAPPING_LEFT: 1
RIGIDITY_LLE: 1
PRONATION_SUPINATION_LEFT: 1
PRONATION_SUPINATION_RIGHT: 0
AMPLITUDE_RUE: 0
FREEZING_GAIT: 0
CONSTANCY_TREMOR_ATREST: 1
SPONTANEITY_OF_MOVEMENT: 1
TOETAPPING_RIGHT: 0
RIGIDITY_RLE: 0
LEG_AGILITY_RIGHT: 1
GAIT: 3
AMPLITUDE_LUE: 1
POSTURAL_STABILITY: 2
SPEECH: 0
LEG_AGILITY_LEFT: 1
AMPLITUDE_LIP_JAW: 0
CHAIR_RISING_SCALE: 1
FINGER_TAPPING_RIGHT: 1
LEVODOPA: NO
DYSKINESIAS_PRESENT: NO

## 2024-07-29 ASSESSMENT — PATIENT HEALTH QUESTIONNAIRE - PHQ9
1. LITTLE INTEREST OR PLEASURE IN DOING THINGS: SEVERAL DAYS
1. LITTLE INTEREST OR PLEASURE IN DOING THINGS: NOT AT ALL
SUM OF ALL RESPONSES TO PHQ9 QUESTIONS 1 & 2: 0
1. LITTLE INTEREST OR PLEASURE IN DOING THINGS: SEVERAL DAYS
2. FEELING DOWN, DEPRESSED OR HOPELESS: SEVERAL DAYS
2. FEELING DOWN, DEPRESSED OR HOPELESS: SEVERAL DAYS
SUM OF ALL RESPONSES TO PHQ9 QUESTIONS 1 AND 2: 2
2. FEELING DOWN, DEPRESSED OR HOPELESS: NOT AT ALL
SUM OF ALL RESPONSES TO PHQ9 QUESTIONS 1 & 2: 2

## 2024-07-29 ASSESSMENT — ENCOUNTER SYMPTOMS
LOSS OF SENSATION IN FEET: 0
OCCASIONAL FEELINGS OF UNSTEADINESS: 1
DEPRESSION: 1

## 2024-07-29 NOTE — LETTER
July 29, 2024     Ruddy Hackett MD  [address] [address]  [facility] [address]    Patient: Deidre Santos   YOB: 1950   Date of Visit: 7/29/2024       Dear Dr. Ruddy Hackett MD:    Thank you for referring Deidre Santos to me for evaluation. Below are my notes for this consultation.  If you have questions, please do not hesitate to call me. I look forward to following your patient along with you.       Sincerely,     Adri Ortega MD      CC: No Recipients  ______________________________________________________________________________________    Subjective    Deidre Santos is a right handed  73 y.o. year old female who presents with No chief complaint on file.. Patient is accompanied by: spouse  Visit type: new patient visit       Her brother was just diagnosed with PD, he is 66 years old. She also notes that the males in her family have all had significant tremors.   (This  is multiple generations and members per  generations )      Review of Motor symptoms:  Tremor:  Location- L leg - unclear when started, told her  about it 2 weeks back.                  Rest/postural/action- resting tremor. This happens when she is resting only.   Stiffness/rigidity: denies, apart from knee stiffness.   Slowness:  she moves slow when on her feet, she needs support due to feeling off balance.   Trouble walking:  shuffles w L leg, it drags a little.   Freezing of gait:  L leg sometimes.   Balance problems:  yes.   Falls:  no recent falls in over a year.   Changes in speech:  denies  Swallowing difficulties:  denies  Abnormal postures:  denies  Handwriting:  no micrographia, is more sloppy than prior.   Activities of daily living (buttoning clothes, bathing, cutting food, etc):  independent, but sometimes needs help w shoes or putting on bra.     Review of Non-Motor symptoms:  Cognition:  Memory- denies         Hallucinations-  denies         Mood:        Depression-  pain can make her garcia at  times, would not say she is depressed.                        Anxiety: denies  Sleep disturbances including REM behavior disorder: sleeps well.  No clear dream enactment.   Sensory changes (ie, smell or taste): denies  Gastrointestinal complaints/Constipation:  yes. BM every week. She takes dulcolax prn.   Urinary retention or frequency:  some urinary issues, some urge incontinence  Positional lightheadedness and/or syncope:  denies  Excessive saliva/drooling:  denies  Fatigue:  denies        Patient Active Problem List   Diagnosis   • Abnormal TSH   • Anxiety   • Balance problem   • Benign essential hypertension   • Conductive deafness   • Depression   • ETD (eustachian tube dysfunction)   • GERD (gastroesophageal reflux disease)   • Hyperlipemia   • Hypothyroidism   • Obesity, morbid (more than 100 lbs over ideal weight or BMI > 40) (Multi)   • Panic attack   • Thrombophlebitis/phlebitis, deep   • Urinary urgency   • Varicose vein of leg   • Fall, accidental   • History of palpitations   • Hordeolum internum of right lower eyelid   • Regular astigmatism of left eye   • Regular astigmatism of right eye   • Status post cataract extraction and insertion of intraocular lens of left eye   • Status post cataract extraction and insertion of intraocular lens of right eye   • Essential (primary) hypertension   • Allergic rhinitis, unspecified   • SVT (supraventricular tachycardia) (CMS-HCC)   • Gait abnormality   • Cogwheel rigidity   • Maculopapular rash, generalized      No past medical history on file.   Past Surgical History:   Procedure Laterality Date   • OTHER SURGICAL HISTORY  04/12/2022    Hysterectomy      Social History     Socioeconomic History   • Marital status:      Spouse name: Not on file   • Number of children: Not on file   • Years of education: Not on file   • Highest education level: Not on file   Occupational History   • Not on file   Tobacco Use   • Smoking status: Never   • Smokeless tobacco:  Never   Substance and Sexual Activity   • Alcohol use: Yes     Comment: 3 beers monthly   • Drug use: Never   • Sexual activity: Not on file   Other Topics Concern   • Not on file   Social History Narrative   • Not on file     Social Determinants of Health     Financial Resource Strain: Not on file   Food Insecurity: Not on file   Transportation Needs: Not on file   Physical Activity: Not on file   Stress: Not on file   Social Connections: Not on file   Intimate Partner Violence: Not on file   Housing Stability: Not on file      Family History   Problem Relation Name Age of Onset   • Breast cancer Mother     • Aortic aneurysm Father     • Cancer Father     • Parkinsonism Brother     • Breast cancer Mother's Sister     • Prostate cancer Sibling        Patient Health Questionnaire-2 Score: 2       Current Outpatient Medications on File Prior to Visit   Medication Sig Dispense Refill   • amLODIPine (Norvasc) 5 mg tablet TAKE 1 TABLET BY MOUTH TWICE A  tablet 0   • fluticasone (Flonase) 50 mcg/actuation nasal spray Administer 2 sprays into each nostril once daily. 16 g 3   • metoprolol tartrate (Lopressor) 50 mg tablet TAKE 1 TABLET BY MOUTH TWICE A  tablet 1   • montelukast (Singulair) 10 mg tablet TAKE ONE TABLET BY MOUTH DAILY. APPOINTMENT NEEDED BEFORE NEXT REFILL 90 tablet 1   • omeprazole (PriLOSEC) 40 mg DR capsule TAKE 1 CAPSULE BY MOUTH EVERY DAY 90 capsule 0   • loratadine (Claritin) 10 mg tablet Take 1 tablet (10 mg) by mouth once daily for 10 days. 10 tablet 0   • LORazepam (Ativan) 0.5 mg tablet Take 1 tablet (0.5 mg) by mouth if needed.     • [DISCONTINUED] predniSONE (Deltasone) 10 mg tablet 3 daily bfor 3 days then 2 tabs daily for 3 days then 1 tab daily for 5days (Patient not taking: Reported on 7/29/2024) 20 tablet 0     No current facility-administered medications on file prior to visit.           Review of Systems  All other system have been reviewed and are negative for  complaint.  Objective  Vitals:    07/29/24 1336   BP: 138/82   Pulse: 76      Neurological Exam  Mental Status  Awake, alert and oriented to person, place and time.    Cranial Nerves  CN II: Visual fields full to confrontation.  CN III, IV, VI: Extraocular movements intact bilaterally.  CN V: Facial sensation is normal.  CN VII: Full and symmetric facial movement.  CN VIII: Hearing is normal.  CN IX, X: Palate elevates symmetrically  CN XI: Shoulder shrug strength is normal.  CN XII: Tongue midline without atrophy or fasciculations.    Motor   Strength is 5/5 in all four extremities except as noted.  R biceps, triceps and   4/5.    Sensory  Light touch is normal in upper and lower extremities. Pinprick is normal in upper and lower extremities.     Reflexes                                            Right                      Left  Brachioradialis                    3+                         2+  Biceps                                 3+                         2+  Triceps                                3+                         2+  Patellar                                2+                         2+  Achilles                                1+                         1+  Right Plantar: mute  Left Plantar: mute    Coordination  Right: Finger-to-nose normal.Left: Finger-to-nose normal. Rapid alternating movement abnormality:  Slight L sided bradykinesia, no ataxia. .    Gait    Ambulates w cane, L arm rest tremor   Antalgic and unsteady gait. .        Significant paratonia, but does appear to have some mild L sided rigidity,.     MDS UPDRS 1st Score: Motor Examination  Is the patient on medication for treating the symptoms of Parkinson's Disease?: No  Is the patient on Levodopa?: No  Speech: 0  Facial Expression: 0  Rigidty Neck: 0  Rigidty RUE: 0  Rigidity - LUE: 1  Rigidity RLE: 0  Rigidity LLE: 1  Finger Tapping Right Hand: 1  Finger Tapping Left Hand: 1  Hand Movements- Right Hand: 0  Hand Movements- Left  Hand: 1  Pronatiaon-Supination Movments - Right Hand: 0  Pronatiaon-Supination Movments Left Hand: 1  Toe Tapping Right Foot: 0  Toe Tapping - Left Foot: 2  Leg Agility - Right Le  Leg Agility - Left le  Arising from Chair: 1  Gait: 3  Freezing of Gait: 0  Postural Stability: 2  Posture: 2  Global Spontanteity of Movment ( Body Bradykinesia): 1  Postural Tremor - Right Hand: 0  Postural Tremor - Left hand: 0  Kinetic Tremor - Right hand: 0  Kinetic Tremor - Left hand: 1  Rest Tremor Amplitude - RUE: 0  Rest Tremor Amplitude - LUE: 1  Rest Tremor Amplitude - RLE: 0  Rest Tremor Amplitude - LLE: 1  Rest Tremor Amplitude - Lip/Jaw: 0  Constancy of Rest Tremor: 1  MDS UPDRS Total Score: 23  Were dyskinesias (chorea or dystonia) present during examination?: No                  Hemoglobin A1C   Date Value Ref Range Status   2022 5.8 (A) % Final     Comment:          Diagnosis of Diabetes-Adults   Non-Diabetic: < or = 5.6%   Increased risk for developing diabetes: 5.7-6.4%   Diagnostic of diabetes: > or = 6.5%  .       Monitoring of Diabetes                Age (y)     Therapeutic Goal (%)   Adults:          >18           <7.0   Pediatrics:    13-18           <7.5                   7-12           <8.0                   0- 6            7.5-8.5   American Diabetes Association. Diabetes Care 33(S1), 2010.       Estimated Average Glucose   Date Value Ref Range Status   2022 120 MG/DL Final     Thyroid Stimulating Hormone   Date Value Ref Range Status   2023 3.11 0.44 - 3.98 mIU/L Final           Assessment/Plan      Deidre Santos is a 73 y.o. year old female here for initial evaluation, referred by her PCP Dr Hackett given a + family hx of PD  as well as findings of cogwheel rigidity on exam. She also endorses a mild L leg rest tremor. On examination she has evidence of a L hemibody resting tremor, in addition to mild L sided bradykinesia. She has paratonia,but agree that maybe some L sided cogwheel  "rigidity. In addition she was noted to have mild RUE weakness, of unclear duration, and likely unrelated.  She also states that her brother whom was recently diagnosed has a \"positive marker\".   We did discuss that based on her clinical examination she does meet criteria for a diagnosis of parkinsonism and ability to confirm same with a Josiane scan. We are both in agreement that clinical monitoring is reasonable at this time.   We will do an MRI brain to assess for causes of her RUE weakness, of unclear duration (likely fully unrelated).   She is interested in participating in PDGeneration.   [facility] in April. Offered virtual educational visit, but she is not interested in same currently.    I spent a total of 60 minutes on this patient's care on the day of their visit excluding time spent related to any billed procedures. This time includes face-to-face time with the patient as well as time spent documenting in the medical record, reviewing patient's records and tests, obtaining history, placing orders, communicating with other healthcare professionals, counseling the patient, family, or caregiver, and/or care coordination for the diagnoses above.     "

## 2024-07-29 NOTE — PROGRESS NOTES
Subjective     Deidre Santos is a right handed  73 y.o. year old female who presents with No chief complaint on file.. Patient is accompanied by: spouse  Visit type: new patient visit       Her brother was just diagnosed with PD, he is 66 years old. She also notes that the males in her family have all had significant tremors.   (This  is multiple generations and members per  generations )      Review of Motor symptoms:  Tremor:  Location- L leg - unclear when started, told her  about it 2 weeks back.                  Rest/postural/action- resting tremor. This happens when she is resting only.   Stiffness/rigidity: denies, apart from knee stiffness.   Slowness:  she moves slow when on her feet, she needs support due to feeling off balance.   Trouble walking:  shuffles w L leg, it drags a little.   Freezing of gait:  L leg sometimes.   Balance problems:  yes.   Falls:  no recent falls in over a year.   Changes in speech:  denies  Swallowing difficulties:  denies  Abnormal postures:  denies  Handwriting:  no micrographia, is more sloppy than prior.   Activities of daily living (buttoning clothes, bathing, cutting food, etc):  independent, but sometimes needs help w shoes or putting on bra.     Review of Non-Motor symptoms:  Cognition:  Memory- denies         Hallucinations-  denies         Mood:        Depression-  pain can make her garcia at times, would not say she is depressed.                        Anxiety: denies  Sleep disturbances including REM behavior disorder: sleeps well.  No clear dream enactment.   Sensory changes (ie, smell or taste): denies  Gastrointestinal complaints/Constipation:  yes. BM every week. She takes dulcolax prn.   Urinary retention or frequency:  some urinary issues, some urge incontinence  Positional lightheadedness and/or syncope:  denies  Excessive saliva/drooling:  denies  Fatigue:  denies        Patient Active Problem List   Diagnosis    Abnormal TSH    Anxiety    Balance  problem    Benign essential hypertension    Conductive deafness    Depression    ETD (eustachian tube dysfunction)    GERD (gastroesophageal reflux disease)    Hyperlipemia    Hypothyroidism    Obesity, morbid (more than 100 lbs over ideal weight or BMI > 40) (Multi)    Panic attack    Thrombophlebitis/phlebitis, deep    Urinary urgency    Varicose vein of leg    Fall, accidental    History of palpitations    Hordeolum internum of right lower eyelid    Regular astigmatism of left eye    Regular astigmatism of right eye    Status post cataract extraction and insertion of intraocular lens of left eye    Status post cataract extraction and insertion of intraocular lens of right eye    Essential (primary) hypertension    Allergic rhinitis, unspecified    SVT (supraventricular tachycardia) (CMS-[facility])    Gait abnormality    Cogwheel rigidity    Maculopapular rash, generalized      No past medical history on file.   Past Surgical History:   Procedure Laterality Date    OTHER SURGICAL HISTORY  04/12/2022    Hysterectomy      Social History     Socioeconomic History    Marital status:      Spouse name: Not on file    Number of children: Not on file    Years of education: Not on file    Highest education level: Not on file   Occupational History    Not on file   Tobacco Use    Smoking status: Never    Smokeless tobacco: Never   Substance and Sexual Activity    Alcohol use: Yes     Comment: 3 beers monthly    Drug use: Never    Sexual activity: Not on file   Other Topics Concern    Not on file   Social History Narrative    Not on file     Social Determinants of Health     Financial Resource Strain: Not on file   Food Insecurity: Not on file   Transportation Needs: Not on file   Physical Activity: Not on file   Stress: Not on file   Social Connections: Not on file   Intimate Partner Violence: Not on file   Housing Stability: Not on file      Family History   Problem Relation Name Age of Onset    Breast cancer Mother       Aortic aneurysm Father      Cancer Father      Parkinsonism Brother      Breast cancer Mother's Sister      Prostate cancer Sibling        Patient Health Questionnaire-2 Score: 2       Current Outpatient Medications on File Prior to Visit   Medication Sig Dispense Refill    amLODIPine (Norvasc) 5 mg tablet TAKE 1 TABLET BY MOUTH TWICE A  tablet 0    fluticasone (Flonase) 50 mcg/actuation nasal spray Administer 2 sprays into each nostril once daily. 16 g 3    metoprolol tartrate (Lopressor) 50 mg tablet TAKE 1 TABLET BY MOUTH TWICE A  tablet 1    montelukast (Singulair) 10 mg tablet TAKE ONE TABLET BY MOUTH DAILY. APPOINTMENT NEEDED BEFORE NEXT REFILL 90 tablet 1    omeprazole (PriLOSEC) 40 mg DR capsule TAKE 1 CAPSULE BY MOUTH EVERY DAY 90 capsule 0    loratadine (Claritin) 10 mg tablet Take 1 tablet (10 mg) by mouth once daily for 10 days. 10 tablet 0    LORazepam (Ativan) 0.5 mg tablet Take 1 tablet (0.5 mg) by mouth if needed.      [DISCONTINUED] predniSONE (Deltasone) 10 mg tablet 3 daily bfor 3 days then 2 tabs daily for 3 days then 1 tab daily for 5days (Patient not taking: Reported on 7/29/2024) 20 tablet 0     No current facility-administered medications on file prior to visit.           Review of Systems  All other system have been reviewed and are negative for complaint.  Objective   Vitals:    07/29/24 1336   BP: 138/82   Pulse: 76      Neurological Exam  Mental Status  Awake, alert and oriented to person, place and time.    Cranial Nerves  CN II: Visual fields full to confrontation.  CN III, IV, VI: Extraocular movements intact bilaterally.  CN V: Facial sensation is normal.  CN VII: Full and symmetric facial movement.  CN VIII: Hearing is normal.  CN IX, X: Palate elevates symmetrically  CN XI: Shoulder shrug strength is normal.  CN XII: Tongue midline without atrophy or fasciculations.    Motor   Strength is 5/5 in all four extremities except as noted.  R biceps, triceps and    4/5.    Sensory  Light touch is normal in upper and lower extremities. Pinprick is normal in upper and lower extremities.     Reflexes                                            Right                      Left  Brachioradialis                    3+                         2+  Biceps                                 3+                         2+  Triceps                                3+                         2+  Patellar                                2+                         2+  Achilles                                1+                         1+  Right Plantar: mute  Left Plantar: mute    Coordination  Right: Finger-to-nose normal.Left: Finger-to-nose normal. Rapid alternating movement abnormality:  Slight L sided bradykinesia, no ataxia. .    Gait    Ambulates w cane, L arm rest tremor   Antalgic and unsteady gait. .        Significant paratonia, but does appear to have some mild L sided rigidity,.     MDS UPDRS 1st Score: Motor Examination  Is the patient on medication for treating the symptoms of Parkinson's Disease?: No  Is the patient on Levodopa?: No  Speech: 0  Facial Expression: 0  Rigidty Neck: 0  Rigidty RUE: 0  Rigidity - LUE: 1  Rigidity RLE: 0  Rigidity LLE: 1  Finger Tapping Right Hand: 1  Finger Tapping Left Hand: 1  Hand Movements- Right Hand: 0  Hand Movements- Left Hand: 1  Pronatiaon-Supination Movments - Right Hand: 0  Pronatiaon-Supination Movments Left Hand: 1  Toe Tapping Right Foot: 0  Toe Tapping - Left Foot: 2  Leg Agility - Right Le  Leg Agility - Left le  Arising from Chair: 1  Gait: 3  Freezing of Gait: 0  Postural Stability: 2  Posture: 2  Global Spontanteity of Movment ( Body Bradykinesia): 1  Postural Tremor - Right Hand: 0  Postural Tremor - Left hand: 0  Kinetic Tremor - Right hand: 0  Kinetic Tremor - Left hand: 1  Rest Tremor Amplitude - RUE: 0  Rest Tremor Amplitude - LUE: 1  Rest Tremor Amplitude - RLE: 0  Rest Tremor Amplitude - LLE: 1  Rest Tremor Amplitude -  "Lip/Jaw: 0  Constancy of Rest Tremor: 1  MDS UPDRS Total Score: 23  Were dyskinesias (chorea or dystonia) present during examination?: No                  Hemoglobin A1C   Date Value Ref Range Status   04/20/2022 5.8 (A) % Final     Comment:          Diagnosis of Diabetes-Adults   Non-Diabetic: < or = 5.6%   Increased risk for developing diabetes: 5.7-6.4%   Diagnostic of diabetes: > or = 6.5%  .       Monitoring of Diabetes                Age (y)     Therapeutic Goal (%)   Adults:          >18           <7.0   Pediatrics:    13-18           <7.5                   7-12           <8.0                   0- 6            7.5-8.5   American Diabetes Association. Diabetes Care 33(S1), Jan 2010.       Estimated Average Glucose   Date Value Ref Range Status   04/20/2022 120 MG/DL Final     Thyroid Stimulating Hormone   Date Value Ref Range Status   12/13/2023 3.11 0.44 - 3.98 mIU/L Final           Assessment/Plan       Deidre Santos is a 73 y.o. year old female here for initial evaluation, referred by her PCP Dr Hackett given a + family hx of PD  as well as findings of cogwheel rigidity on exam. She also endorses a mild L leg rest tremor. On examination she has evidence of a L hemibody resting tremor, in addition to mild L sided bradykinesia. She has paratonia,but agree that maybe some L sided cogwheel rigidity. In addition she was noted to have mild RUE weakness, of unclear duration, and likely unrelated.  She also states that her brother whom was recently diagnosed has a \"positive marker\".   We did discuss that based on her clinical examination she does meet criteria for a diagnosis of parkinsonism and ability to confirm same with a Josiane scan. We are both in agreement that clinical monitoring is reasonable at this time.   We will do an MRI brain to assess for causes of her RUE weakness, of unclear duration (likely fully unrelated).   She is interested in participating in PDGeneration.   [facility] in April. Offered virtual " educational visit, but she is not interested in same currently.    I spent a total of 60 minutes on this patient's care on the day of their visit excluding time spent related to any billed procedures. This time includes face-to-face time with the patient as well as time spent documenting in the medical record, reviewing patient's records and tests, obtaining history, placing orders, communicating with other healthcare professionals, counseling the patient, family, or caregiver, and/or care coordination for the diagnoses above.

## 2024-07-29 NOTE — PATIENT INSTRUCTIONS
It was nice to meet  you today.   You have mild L sided parkinsonism - you have a resting tremor of the L hand and leg, as well as some mild L sided slowness of movements. We can observe you, or we can perform a Josiane scan, however the management will not be different.     I will have you set up to do PDGeneration - this is the genetic marker study for Parkinsons.     In addition I noticed that you have R arm weakness. We will do a brain MRI.     You did not want an educational visit (virtual) w our NP, let us know if you change your mind.     Try to exercise as much as you can     RTC in 6-9 months.

## 2024-07-31 RX ORDER — FLUTICASONE PROPIONATE 50 MCG
2 SPRAY, SUSPENSION (ML) NASAL DAILY
Qty: 16 ML | Refills: 0 | Status: SHIPPED | OUTPATIENT
Start: 2024-07-31

## 2024-07-31 RX ORDER — MONTELUKAST SODIUM 10 MG/1
TABLET ORAL
Qty: 90 TABLET | Refills: 0 | Status: SHIPPED | OUTPATIENT
Start: 2024-07-31

## 2024-09-09 DIAGNOSIS — I10 ESSENTIAL (PRIMARY) HYPERTENSION: ICD-10-CM

## 2024-09-10 ENCOUNTER — CLINICAL SUPPORT (OUTPATIENT)
Dept: PRIMARY CARE | Facility: CLINIC | Age: 74
End: 2024-09-10
Payer: MEDICARE

## 2024-09-10 DIAGNOSIS — Z23 NEED FOR INFLUENZA VACCINATION: ICD-10-CM

## 2024-09-10 DIAGNOSIS — R29.898 COGWHEEL RIGIDITY: ICD-10-CM

## 2024-09-10 DIAGNOSIS — E66.01 OBESITY, MORBID (MORE THAN 100 LBS OVER IDEAL WEIGHT OR BMI > 40) (MULTI): Primary | ICD-10-CM

## 2024-09-10 PROCEDURE — G0008 ADMIN INFLUENZA VIRUS VAC: HCPCS | Performed by: INTERNAL MEDICINE

## 2024-09-10 PROCEDURE — 90662 IIV NO PRSV INCREASED AG IM: CPT | Performed by: INTERNAL MEDICINE

## 2024-09-10 NOTE — PROGRESS NOTES
Patient in office to get influenza vaccine ordered by Dr. Hackett.  Dr. aHckett in office to supervise.

## 2024-09-11 RX ORDER — AMLODIPINE BESYLATE 5 MG/1
5 TABLET ORAL 2 TIMES DAILY
Qty: 180 TABLET | Refills: 1 | Status: SHIPPED | OUTPATIENT
Start: 2024-09-11

## 2024-09-25 DIAGNOSIS — J30.9 ALLERGIC RHINITIS, UNSPECIFIED SEASONALITY, UNSPECIFIED TRIGGER: ICD-10-CM

## 2024-09-27 RX ORDER — FLUTICASONE PROPIONATE 50 MCG
2 SPRAY, SUSPENSION (ML) NASAL DAILY
Qty: 16 G | Refills: 1 | Status: SHIPPED | OUTPATIENT
Start: 2024-09-27

## 2024-10-31 DIAGNOSIS — J30.9 ALLERGIC RHINITIS, UNSPECIFIED: ICD-10-CM

## 2024-11-04 RX ORDER — MONTELUKAST SODIUM 10 MG/1
TABLET ORAL
Qty: 90 TABLET | Refills: 0 | Status: SHIPPED | OUTPATIENT
Start: 2024-11-04

## 2024-11-06 DIAGNOSIS — I10 ESSENTIAL (PRIMARY) HYPERTENSION: ICD-10-CM

## 2024-11-18 RX ORDER — METOPROLOL TARTRATE 50 MG/1
50 TABLET ORAL 2 TIMES DAILY
Qty: 180 TABLET | Refills: 0 | Status: SHIPPED | OUTPATIENT
Start: 2024-11-18

## 2024-11-26 DIAGNOSIS — J30.9 ALLERGIC RHINITIS, UNSPECIFIED SEASONALITY, UNSPECIFIED TRIGGER: ICD-10-CM

## 2024-12-03 RX ORDER — FLUTICASONE PROPIONATE 50 MCG
2 SPRAY, SUSPENSION (ML) NASAL DAILY
Qty: 16 ML | Refills: 1 | Status: SHIPPED | OUTPATIENT
Start: 2024-12-03

## 2024-12-10 ENCOUNTER — APPOINTMENT (OUTPATIENT)
Dept: PRIMARY CARE | Facility: CLINIC | Age: 74
End: 2024-12-10
Payer: MEDICARE

## 2024-12-10 VITALS
HEART RATE: 86 BPM | DIASTOLIC BLOOD PRESSURE: 89 MMHG | WEIGHT: 285 LBS | HEIGHT: 66 IN | SYSTOLIC BLOOD PRESSURE: 164 MMHG | BODY MASS INDEX: 45.8 KG/M2

## 2024-12-10 DIAGNOSIS — I10 ESSENTIAL (PRIMARY) HYPERTENSION: Primary | ICD-10-CM

## 2024-12-10 DIAGNOSIS — J30.9 ALLERGIC RHINITIS, UNSPECIFIED SEASONALITY, UNSPECIFIED TRIGGER: ICD-10-CM

## 2024-12-10 PROCEDURE — 1159F MED LIST DOCD IN RCRD: CPT | Performed by: FAMILY MEDICINE

## 2024-12-10 PROCEDURE — 3077F SYST BP >= 140 MM HG: CPT | Performed by: FAMILY MEDICINE

## 2024-12-10 PROCEDURE — 99214 OFFICE O/P EST MOD 30 MIN: CPT | Performed by: FAMILY MEDICINE

## 2024-12-10 PROCEDURE — 3079F DIAST BP 80-89 MM HG: CPT | Performed by: FAMILY MEDICINE

## 2024-12-10 PROCEDURE — 3008F BODY MASS INDEX DOCD: CPT | Performed by: FAMILY MEDICINE

## 2024-12-10 PROCEDURE — 1124F ACP DISCUSS-NO DSCNMKR DOCD: CPT | Performed by: FAMILY MEDICINE

## 2024-12-10 RX ORDER — FLUTICASONE PROPIONATE 50 MCG
2 SPRAY, SUSPENSION (ML) NASAL DAILY
Qty: 16 ML | Refills: 1 | Status: SHIPPED | OUTPATIENT
Start: 2024-12-10

## 2024-12-10 RX ORDER — AMLODIPINE BESYLATE 10 MG/1
10 TABLET ORAL DAILY
Qty: 90 TABLET | Refills: 1 | Status: SHIPPED | OUTPATIENT
Start: 2024-12-10 | End: 2025-06-08

## 2024-12-10 ASSESSMENT — ENCOUNTER SYMPTOMS
DIZZINESS: 0
FATIGUE: 0
SHORTNESS OF BREATH: 0
ACTIVITY CHANGE: 0
HEADACHES: 0
FEVER: 0

## 2024-12-10 NOTE — PROGRESS NOTES
"Subjective   Patient ID: Deidre Santos is a 73 y.o. female who presents for Med Refill.    Primary hypertension   - reports she has not been taking her blood pressure medication regularly   - has some concerns with the amlodipine and thus has poor compliance  - reports she does not always eat healthy, and does not remain active   - no headaches, no chest pain, no shortness of breath     Allergies   - has not been compliant with singulair   - does take flonase daily   - denies any significant allergy symptoms            Review of Systems   Constitutional:  Negative for activity change, fatigue and fever.   Respiratory:  Negative for shortness of breath.    Cardiovascular:  Negative for chest pain.   Neurological:  Negative for dizziness and headaches.       Objective   /89   Pulse 86   Ht 1.676 m (5' 6\")   Wt 129 kg (285 lb)   BMI 46.00 kg/m²     Physical Exam  Constitutional:       Appearance: Normal appearance.   Cardiovascular:      Rate and Rhythm: Normal rate and regular rhythm.   Pulmonary:      Effort: Pulmonary effort is normal.      Breath sounds: Normal breath sounds.   Neurological:      Mental Status: She is alert.   Psychiatric:         Mood and Affect: Mood normal.         Behavior: Behavior normal.         Assessment/Plan   Problem List Items Addressed This Visit             ICD-10-CM    Essential (primary) hypertension - Primary I10     Stable   - adjusted amlodipine to once daily   - f/u 3 months to monitor BP   - discussed compliance and lifestyle changes            Relevant Medications    amLODIPine (Norvasc) 10 mg tablet    Allergic rhinitis, unspecified J30.9     Stable  - flonase refilled today          Relevant Medications    fluticasone (Flonase) 50 mcg/actuation nasal spray          "

## 2024-12-10 NOTE — ASSESSMENT & PLAN NOTE
Stable   - adjusted amlodipine to once daily   - f/u 3 months to monitor BP   - discussed compliance and lifestyle changes

## 2024-12-17 ENCOUNTER — APPOINTMENT (OUTPATIENT)
Dept: PRIMARY CARE | Facility: CLINIC | Age: 74
End: 2024-12-17
Payer: MEDICARE

## 2025-02-06 DIAGNOSIS — J30.9 ALLERGIC RHINITIS, UNSPECIFIED: ICD-10-CM

## 2025-02-10 RX ORDER — MONTELUKAST SODIUM 10 MG/1
TABLET ORAL
Qty: 90 TABLET | Refills: 1 | Status: SHIPPED | OUTPATIENT
Start: 2025-02-10

## 2025-02-21 DIAGNOSIS — I10 ESSENTIAL (PRIMARY) HYPERTENSION: ICD-10-CM

## 2025-02-24 RX ORDER — METOPROLOL TARTRATE 50 MG/1
50 TABLET ORAL 2 TIMES DAILY
Qty: 180 TABLET | Refills: 1 | Status: SHIPPED | OUTPATIENT
Start: 2025-02-24

## 2025-04-09 DIAGNOSIS — J30.9 ALLERGIC RHINITIS, UNSPECIFIED SEASONALITY, UNSPECIFIED TRIGGER: ICD-10-CM

## 2025-04-10 RX ORDER — FLUTICASONE PROPIONATE 50 MCG
2 SPRAY, SUSPENSION (ML) NASAL DAILY
Qty: 16 ML | Refills: 1 | Status: SHIPPED | OUTPATIENT
Start: 2025-04-10

## 2025-04-15 ENCOUNTER — APPOINTMENT (OUTPATIENT)
Dept: PRIMARY CARE | Facility: CLINIC | Age: 75
End: 2025-04-15
Payer: MEDICARE

## 2025-04-15 VITALS
HEIGHT: 66 IN | HEART RATE: 74 BPM | TEMPERATURE: 99.7 F | WEIGHT: 274.2 LBS | SYSTOLIC BLOOD PRESSURE: 151 MMHG | BODY MASS INDEX: 44.07 KG/M2 | DIASTOLIC BLOOD PRESSURE: 89 MMHG

## 2025-04-15 DIAGNOSIS — Z78.9 OTHER SPECIFIED HEALTH STATUS: ICD-10-CM

## 2025-04-15 DIAGNOSIS — E66.01 CLASS 3 SEVERE OBESITY DUE TO EXCESS CALORIES WITH BODY MASS INDEX (BMI) OF 40.0 TO 44.9 IN ADULT, UNSPECIFIED WHETHER SERIOUS COMORBIDITY PRESENT: ICD-10-CM

## 2025-04-15 DIAGNOSIS — Z78.9 NEVER SMOKED CIGARETTES: ICD-10-CM

## 2025-04-15 DIAGNOSIS — E66.813 CLASS 3 SEVERE OBESITY DUE TO EXCESS CALORIES WITH BODY MASS INDEX (BMI) OF 40.0 TO 44.9 IN ADULT, UNSPECIFIED WHETHER SERIOUS COMORBIDITY PRESENT: ICD-10-CM

## 2025-04-15 DIAGNOSIS — F41.9 ANXIETY: ICD-10-CM

## 2025-04-15 DIAGNOSIS — G20.A1 PARKINSON'S DISEASE, UNSPECIFIED WHETHER DYSKINESIA PRESENT, UNSPECIFIED WHETHER MANIFESTATIONS FLUCTUATE: ICD-10-CM

## 2025-04-15 DIAGNOSIS — J30.9 ALLERGIC RHINITIS, UNSPECIFIED: ICD-10-CM

## 2025-04-15 DIAGNOSIS — I10 ESSENTIAL (PRIMARY) HYPERTENSION: ICD-10-CM

## 2025-04-15 PROCEDURE — 1123F ACP DISCUSS/DSCN MKR DOCD: CPT | Performed by: INTERNAL MEDICINE

## 2025-04-15 PROCEDURE — 3008F BODY MASS INDEX DOCD: CPT | Performed by: INTERNAL MEDICINE

## 2025-04-15 PROCEDURE — 3077F SYST BP >= 140 MM HG: CPT | Performed by: INTERNAL MEDICINE

## 2025-04-15 PROCEDURE — 1036F TOBACCO NON-USER: CPT | Performed by: INTERNAL MEDICINE

## 2025-04-15 PROCEDURE — 1158F ADVNC CARE PLAN TLK DOCD: CPT | Performed by: INTERNAL MEDICINE

## 2025-04-15 PROCEDURE — 1160F RVW MEDS BY RX/DR IN RCRD: CPT | Performed by: INTERNAL MEDICINE

## 2025-04-15 PROCEDURE — 99214 OFFICE O/P EST MOD 30 MIN: CPT | Performed by: INTERNAL MEDICINE

## 2025-04-15 PROCEDURE — 1159F MED LIST DOCD IN RCRD: CPT | Performed by: INTERNAL MEDICINE

## 2025-04-15 PROCEDURE — 3079F DIAST BP 80-89 MM HG: CPT | Performed by: INTERNAL MEDICINE

## 2025-04-15 RX ORDER — BISACODYL 5 MG
5 TABLET, DELAYED RELEASE (ENTERIC COATED) ORAL DAILY PRN
COMMUNITY

## 2025-04-15 RX ORDER — OMEPRAZOLE 40 MG/1
40 CAPSULE, DELAYED RELEASE ORAL AS NEEDED
Qty: 90 CAPSULE | Refills: 1 | Status: SHIPPED | OUTPATIENT
Start: 2025-04-15

## 2025-04-15 RX ORDER — ESCITALOPRAM OXALATE 10 MG/1
10 TABLET ORAL DAILY
Qty: 90 TABLET | Refills: 1 | Status: SHIPPED | OUTPATIENT
Start: 2025-04-15 | End: 2025-04-15 | Stop reason: SDUPTHER

## 2025-04-15 RX ORDER — PHENYLPROPANOLAMINE/CLEMASTINE 75-1.34MG
400 TABLET, EXTENDED RELEASE ORAL AS NEEDED
COMMUNITY

## 2025-04-15 RX ORDER — ESCITALOPRAM OXALATE 10 MG/1
10 TABLET ORAL DAILY
Qty: 90 TABLET | Refills: 0 | Status: SHIPPED | OUTPATIENT
Start: 2025-04-15

## 2025-04-15 ASSESSMENT — PATIENT HEALTH QUESTIONNAIRE - PHQ9
2. FEELING DOWN, DEPRESSED OR HOPELESS: MORE THAN HALF THE DAYS
10. IF YOU CHECKED OFF ANY PROBLEMS, HOW DIFFICULT HAVE THESE PROBLEMS MADE IT FOR YOU TO DO YOUR WORK, TAKE CARE OF THINGS AT HOME, OR GET ALONG WITH OTHER PEOPLE: NOT DIFFICULT AT ALL
1. LITTLE INTEREST OR PLEASURE IN DOING THINGS: NOT AT ALL
SUM OF ALL RESPONSES TO PHQ9 QUESTIONS 1 AND 2: 2

## 2025-04-15 NOTE — PROGRESS NOTES
Assessment/Plan   Problem List Items Addressed This Visit       Anxiety    Relevant Medications    escitalopram (Lexapro) 10 mg tablet    Essential (primary) hypertension    Allergic rhinitis, unspecified    Parkinson's disease    Never smoked cigarettes    Class 3 severe obesity due to excess calories with body mass index (BMI) of 40.0 to 44.9 in adult     Other Visit Diagnoses       Other specified health status        Relevant Medications    omeprazole (PriLOSEC) 40 mg DR capsule        Anxiety disorder, sometimes exacerbates the tremor for which Lexapro is being prescribed  She has used lorazepam in the past but in view of its habit-forming effect is not being prescribed  Essential hypertension is not under control because compliance is an issue  She should follow-up in 6 weeks time to assess the control of blood pressure  Compliance was discussed  Parkinson's disease though it seems to be stable with the tremor and cogwheel rigidity she has to follow-up with neurologist and will discuss treatment as it could improve her quality of life  Obesity she has been losing weight and has been constipated eating less  I would advise some fiber to diet to help improve the constipation    Subjective   Patient ID: Deidre Santos is a 74 y.o. female who presents for Follow-up.    Past Surgical History:   Procedure Laterality Date    OTHER SURGICAL HISTORY  04/12/2022    Hysterectomy      Family History   Problem Relation Name Age of Onset    Breast cancer Mother      Aortic aneurysm Father      Cancer Father      Parkinsonism Brother      Breast cancer Mother's Sister      Prostate cancer Sibling        Social History     Socioeconomic History    Marital status:      Spouse name: Not on file    Number of children: Not on file    Years of education: Not on file    Highest education level: Not on file   Occupational History    Not on file   Tobacco Use    Smoking status: Never    Smokeless tobacco: Never   Vaping Use     "Vaping status: Never Used   Substance and Sexual Activity    Alcohol use: Yes     Comment: 3 beers monthly    Drug use: Never    Sexual activity: Not on file   Other Topics Concern    Not on file   Social History Narrative    Not on file     Social Drivers of Health     Financial Resource Strain: Not on file   Food Insecurity: Not on file   Transportation Needs: Not on file   Physical Activity: Not on file   Stress: Not on file   Social Connections: Not on file   Intimate Partner Violence: Not on file   Housing Stability: Not on file      Patient has no known allergies.   Current Outpatient Medications   Medication Sig Dispense Refill    amLODIPine (Norvasc) 10 mg tablet Take 1 tablet (10 mg) by mouth once daily. 90 tablet 1    bisacodyl (Dulcolax) 5 mg EC tablet Take 1 tablet (5 mg) by mouth once daily as needed for constipation. Do not crush, chew, or split.      fluticasone (Flonase) 50 mcg/actuation nasal spray ADMINISTER 2 SPRAYS INTO EACH NOSTRIL ONCE DAILY. 16 mL 1    ibuprofen (Advil Liqui-GeL) capsule Take 2 capsules (400 mg) by mouth if needed.      LORazepam (Ativan) 0.5 mg tablet Take 1 tablet (0.5 mg) by mouth if needed.      metoprolol tartrate (Lopressor) 50 mg tablet TAKE 1 TABLET BY MOUTH TWICE A  tablet 1    montelukast (Singulair) 10 mg tablet TAKE ONE TABLET BY MOUTH DAILY. APPOINTMENT NEEDED BEFORE NEXT REFILL 90 tablet 1    escitalopram (Lexapro) 10 mg tablet Take 1 tablet (10 mg) by mouth once daily. 90 tablet 0    omeprazole (PriLOSEC) 40 mg DR capsule Take 1 capsule (40 mg) by mouth if needed (Acid reflux). 90 capsule 1     No current facility-administered medications for this visit.      Vitals:    04/15/25 1255   BP: 151/89   BP Location: Right arm   Patient Position: Sitting   Pulse: 74   Temp: 37.6 °C (99.7 °F)   Weight: 124 kg (274 lb 3.2 oz)   Height: 1.676 m (5' 6\")      Problem List Items Addressed This Visit       Anxiety    Relevant Medications    escitalopram (Lexapro) 10 " "mg tablet    Essential (primary) hypertension    Allergic rhinitis, unspecified    Parkinson's disease    Never smoked cigarettes    Class 3 severe obesity due to excess calories with body mass index (BMI) of 40.0 to 44.9 in adult     Other Visit Diagnoses       Other specified health status        Relevant Medications    omeprazole (PriLOSEC) 40 mg DR capsule           No orders of the defined types were placed in this encounter.       HPI  Came for periodic review and follow-up  Her blood pressure was not controlled in the last visit with    And the dose of amlodipine was increased  Blood pressure still not under control and she states she has not been compliant with medication    ROS occasional increase in the tremor and when the anxiety can significantly increased lorazepam has helped as well  No worsening of the swelling of the feet  Other systemic inquiry negative  Past medical history reviewed  Social and family history reviewed  Allergies and medications reviewed  Recent labs reviewed  Vital signs reviewed    PHYSICAL EXAM  Resting tremor  Cogwheel rigidity  Chest clear  Heart sounds regular  Abdomen soft nontender  Neuro awake alert    We will discuss the results on a follow-up visit        No results found for: \"PR1\", \"BMPR1A\", \"CMPLAS\", \"CG0JGCTA\", \"KPSAT\"   Lab Results   Component Value Date    CHOL 186 07/11/2023    CHHDL 3.0 07/11/2023     Lab Results   Component Value Date    TSH 3.11 12/13/2023    HGBA1C 5.8 (A) 04/20/2022                                     "

## 2025-05-27 ENCOUNTER — APPOINTMENT (OUTPATIENT)
Dept: PRIMARY CARE | Facility: CLINIC | Age: 75
End: 2025-05-27
Payer: MEDICARE

## 2025-05-27 VITALS
HEIGHT: 66 IN | BODY MASS INDEX: 42.88 KG/M2 | DIASTOLIC BLOOD PRESSURE: 78 MMHG | WEIGHT: 266.8 LBS | SYSTOLIC BLOOD PRESSURE: 151 MMHG | HEART RATE: 67 BPM

## 2025-05-27 DIAGNOSIS — K21.9 GASTROESOPHAGEAL REFLUX DISEASE WITHOUT ESOPHAGITIS: ICD-10-CM

## 2025-05-27 DIAGNOSIS — F32.A DEPRESSION, UNSPECIFIED DEPRESSION TYPE: ICD-10-CM

## 2025-05-27 DIAGNOSIS — Z00.00 WELL ADULT HEALTH CHECK: ICD-10-CM

## 2025-05-27 DIAGNOSIS — E66.813 CLASS 3 SEVERE OBESITY DUE TO EXCESS CALORIES WITH BODY MASS INDEX (BMI) OF 40.0 TO 44.9 IN ADULT: ICD-10-CM

## 2025-05-27 DIAGNOSIS — G20.A1 PARKINSON'S DISEASE WITHOUT DYSKINESIA OR FLUCTUATING MANIFESTATIONS: ICD-10-CM

## 2025-05-27 DIAGNOSIS — R26.9 GAIT ABNORMALITY: ICD-10-CM

## 2025-05-27 DIAGNOSIS — F41.9 ANXIETY: ICD-10-CM

## 2025-05-27 DIAGNOSIS — I10 BENIGN ESSENTIAL HYPERTENSION: Primary | ICD-10-CM

## 2025-05-27 PROCEDURE — 1159F MED LIST DOCD IN RCRD: CPT | Performed by: INTERNAL MEDICINE

## 2025-05-27 PROCEDURE — 99214 OFFICE O/P EST MOD 30 MIN: CPT | Performed by: INTERNAL MEDICINE

## 2025-05-27 PROCEDURE — 3078F DIAST BP <80 MM HG: CPT | Performed by: INTERNAL MEDICINE

## 2025-05-27 PROCEDURE — 1160F RVW MEDS BY RX/DR IN RCRD: CPT | Performed by: INTERNAL MEDICINE

## 2025-05-27 PROCEDURE — 3077F SYST BP >= 140 MM HG: CPT | Performed by: INTERNAL MEDICINE

## 2025-05-27 PROCEDURE — 3008F BODY MASS INDEX DOCD: CPT | Performed by: INTERNAL MEDICINE

## 2025-05-27 NOTE — PROGRESS NOTES
Assessment/Plan   Problem List Items Addressed This Visit       Anxiety    Relevant Orders    TSH with reflex to Free T4 if abnormal    Benign essential hypertension - Primary    Relevant Orders    Comprehensive Metabolic Panel    Depression    Relevant Orders    TSH with reflex to Free T4 if abnormal    GERD (gastroesophageal reflux disease)    Gait abnormality    Parkinson's disease    Class 3 severe obesity due to excess calories with body mass index (BMI) of 40.0 to 44.9 in adult    Relevant Orders    Comprehensive Metabolic Panel    Lipid Panel     Other Visit Diagnoses         Well adult health check        Relevant Orders    Comprehensive Metabolic Panel        LATE ENTRY DUE TO COMPEUTER RELATED ISSUE  Escitalopram being weaned off as it is not being felt good treatment for  Tremor stable probably addressed with associated Parkinson's disease condition is stable  Obesity discussed the care  GERD stable  Hypertension under control  Labs as ordered  Plan follow-up in 3 months    Subjective   Patient ID: Deidre Santos is a 74 y.o. female who presents for Follow-up (6 weeks follow up on medication.  Still has hand shakes on the left hand.  States that she does not think the medication is helping.  ).    Surgical History[1]   Family History[2]   Social History     Socioeconomic History    Marital status:      Spouse name: Not on file    Number of children: Not on file    Years of education: Not on file    Highest education level: Not on file   Occupational History    Not on file   Tobacco Use    Smoking status: Never    Smokeless tobacco: Never   Vaping Use    Vaping status: Never Used   Substance and Sexual Activity    Alcohol use: Yes     Comment: 3 beers monthly    Drug use: Never    Sexual activity: Not on file   Other Topics Concern    Not on file   Social History Narrative    Not on file     Social Drivers of Health     Financial Resource Strain: Not on file   Food Insecurity: Not on file  "  Transportation Needs: Not on file   Physical Activity: Not on file   Stress: Not on file   Social Connections: Not on file   Intimate Partner Violence: Not on file   Housing Stability: Not on file      Patient has no known allergies.   Current Rx[3]   Vitals:    05/27/25 1258   BP: 151/78   BP Location: Right arm   Patient Position: Sitting   Pulse: 67   Weight: 121 kg (266 lb 12.8 oz)   Height: 1.676 m (5' 6\")      Problem List Items Addressed This Visit       Anxiety    Relevant Orders    TSH with reflex to Free T4 if abnormal    Benign essential hypertension - Primary    Relevant Orders    Comprehensive Metabolic Panel    Depression    Relevant Orders    TSH with reflex to Free T4 if abnormal    GERD (gastroesophageal reflux disease)    Gait abnormality    Parkinson's disease    Class 3 severe obesity due to excess calories with body mass index (BMI) of 40.0 to 44.9 in adult    Relevant Orders    Comprehensive Metabolic Panel    Lipid Panel     Other Visit Diagnoses         Well adult health check        Relevant Orders    Comprehensive Metabolic Panel           Orders Placed This Encounter   Procedures    Comprehensive Metabolic Panel     Standing Status:   Future     Number of Occurrences:   1     Expected Date:   5/27/2025     Expiration Date:   5/27/2026     Release result to [facility]:   Immediate    Lipid Panel     Standing Status:   Future     Number of Occurrences:   1     Expected Date:   5/27/2025     Expiration Date:   5/27/2026     Release result to [facility]:   Immediate    TSH with reflex to Free T4 if abnormal     Standing Status:   Future     Number of Occurrences:   1     Expected Date:   5/27/2025     Expiration Date:   5/27/2026     Release result to [facility]:   Immediate        HPI  Follow-up  Did not like escitalopram  Okay to wean off  She was concerned about tremor  Tremor is at rest not worse than before  She has Parkinson's disease cogwheel rigidity is associated with    ROS  As above  Past " "medical history reviewed  Social and family history reviewed  Allergies and medications reviewed  Recent labs reviewed  Vital signs reviewed    PHYSICAL EXAM  Resting tremor  Cogwheel rigidity  Rest of the examination is unchanged      No results found for: \"PR1\", \"BMPR1A\", \"CMPLAS\", \"KL5GRGLM\", \"KPSAT\"   Lab Results   Component Value Date    CHOL 186 07/11/2023    CHHDL 3.0 07/11/2023     Lab Results   Component Value Date    TSH 3.11 12/13/2023    HGBA1C 5.8 (A) 04/20/2022                           "                                                                                                                                                                                                                                     Assessment/Plan   Problem List Items Addressed This Visit       Anxiety    Relevant Orders    TSH with reflex to Free T4 if abnormal    Benign essential hypertension - Primary    Relevant Orders    Comprehensive Metabolic Panel    Depression    Relevant Orders    TSH with reflex to Free T4 if abnormal    GERD (gastroesophageal reflux disease)    Gait abnormality    Parkinson's disease    Class 3 severe obesity due to excess calories with body mass index (BMI) of 40.0 to 44.9 in adult    Relevant Orders    Comprehensive Metabolic Panel    Lipid Panel     Other Visit Diagnoses         Well adult health check        Relevant Orders    Comprehensive Metabolic Panel        She did not want to continue the escitalopram despite of anxiety and depression has been an issue  Her tremor is more related with Parkinson's disease  She is happy to lose weight and working towards  Otherwise the conditions are stable  Follow-up with primary care physicianwhom she chose as I will be retiring    Subjective   Patient ID: Deidre Santos is a 74 y.o. female who presents for Follow-up (6 weeks follow up on medication.  Still has hand shakes on the left hand.  States that she does not think the medication is helping.  ).    Surgical History[4]   Family History[5]   Social History     Socioeconomic History    Marital status:      Spouse name: Not on file    Number of children: Not on file    Years of education: Not on file    Highest education level: Not on file   Occupational History    Not on file   Tobacco Use    Smoking status: Never    Smokeless tobacco: Never   Vaping Use    Vaping status: Never Used   Substance and Sexual Activity    Alcohol use: Yes     Comment: 3 beers monthly    Drug use: Never     "Sexual activity: Not on file   Other Topics Concern    Not on file   Social History Narrative    Not on file     Social Drivers of Health     Financial Resource Strain: Not on file   Food Insecurity: Not on file   Transportation Needs: Not on file   Physical Activity: Not on file   Stress: Not on file   Social Connections: Not on file   Intimate Partner Violence: Not on file   Housing Stability: Not on file      Patient has no known allergies.   Current Rx[6]   Vitals:    05/27/25 1258   BP: 151/78   BP Location: Right arm   Patient Position: Sitting   Pulse: 67   Weight: 121 kg (266 lb 12.8 oz)   Height: 1.676 m (5' 6\")      Problem List Items Addressed This Visit       Anxiety    Relevant Orders    TSH with reflex to Free T4 if abnormal    Benign essential hypertension - Primary    Relevant Orders    Comprehensive Metabolic Panel    Depression    Relevant Orders    TSH with reflex to Free T4 if abnormal    GERD (gastroesophageal reflux disease)    Gait abnormality    Parkinson's disease    Class 3 severe obesity due to excess calories with body mass index (BMI) of 40.0 to 44.9 in adult    Relevant Orders    Comprehensive Metabolic Panel    Lipid Panel     Other Visit Diagnoses         Well adult health check        Relevant Orders    Comprehensive Metabolic Panel           Orders Placed This Encounter   Procedures    Comprehensive Metabolic Panel     Standing Status:   Future     Number of Occurrences:   1     Expected Date:   5/27/2025     Expiration Date:   5/27/2026     Release result to [facility]:   Immediate    Lipid Panel     Standing Status:   Future     Number of Occurrences:   1     Expected Date:   5/27/2025     Expiration Date:   5/27/2026     Release result to [facility]:   Immediate    TSH with reflex to Free T4 if abnormal     Standing Status:   Future     Number of Occurrences:   1     Expected Date:   5/27/2025     Expiration Date:   5/27/2026     Release result to [facility] [place]:   Immediate        HPI  Came " for review  Losing weight  Did not want to continue Lexapro  She has been advised to wean it off not suddenly stopping  Her anxiety and depression stable state  Same as before    ROS as above otherwise negative  Past medical history reviewed  Social and family history reviewed  Allergies and medications reviewed  Recent labs reviewed  Vital signs reviewed    PHYSICAL EXAM  Using the stick to help the gait                                              Heart  sounds                                                                                                                                                                                                                                                                                                                                                                                                                                                                                                                                                                                                                                                                                                                                                                                                                                                                                                                                                                                                                                                                                                                                                                                                                                                                                                                                                                                                                                                                                                                                                                                                                                                                                                                                                                                                                                                                                                                                                                                                                         "                                                                                                                                                                                                    No results found for: \"PR1\", \"BMPR1A\", \"CMPLAS\", \"FW2MZLWR\", \"KPSAT\"   Lab Results   Component Value Date    CHOL 186 07/11/2023    CHHDL 3.0 07/11/2023     Lab Results   Component Value Date    TSH 3.11 12/13/2023    HGBA1C 5.8 (A) 04/20/2022                                          [1]   Past Surgical History:  Procedure Laterality Date    OTHER SURGICAL HISTORY  04/12/2022    Hysterectomy   [2]   Family History  Problem Relation Name Age of Onset    Breast cancer Mother      Aortic aneurysm Father      Cancer Father      Parkinsonism Brother      Breast cancer Mother's Sister      Prostate cancer Sibling     [3]   Current Outpatient Medications   Medication Sig Dispense Refill    amLODIPine (Norvasc) 10 mg tablet Take 1 tablet (10 mg) by mouth once daily. 90 tablet 1    bisacodyl (Dulcolax) 5 mg EC tablet Take 1 tablet (5 mg) by mouth once daily as needed for constipation. Do not crush, chew, or split.      escitalopram (Lexapro) 10 mg tablet Take 1 tablet (10 mg) by mouth once daily. 90 tablet 0    fluticasone (Flonase) 50 mcg/actuation nasal spray ADMINISTER 2 SPRAYS INTO EACH NOSTRIL ONCE DAILY. 16 mL 1    ibuprofen (Advil Liqui-GeL) capsule Take 2 capsules (400 mg) by mouth if needed.      metoprolol tartrate (Lopressor) 50 mg tablet TAKE 1 TABLET BY MOUTH TWICE A  tablet 1    omeprazole (PriLOSEC) 40 mg DR capsule Take 1 capsule (40 mg) by mouth if needed (Acid reflux). 90 capsule 1     No current facility-administered medications for this visit.   [4]   Past Surgical History:  Procedure Laterality Date    OTHER SURGICAL HISTORY  04/12/2022    Hysterectomy   [5]   Family History  Problem Relation Name Age of Onset    Breast cancer Mother      Aortic aneurysm Father      Cancer Father      Parkinsonism Brother "      Breast cancer Mother's Sister      Prostate cancer Sibling     [6]   Current Outpatient Medications   Medication Sig Dispense Refill    amLODIPine (Norvasc) 10 mg tablet Take 1 tablet (10 mg) by mouth once daily. 90 tablet 1    bisacodyl (Dulcolax) 5 mg EC tablet Take 1 tablet (5 mg) by mouth once daily as needed for constipation. Do not crush, chew, or split.      escitalopram (Lexapro) 10 mg tablet Take 1 tablet (10 mg) by mouth once daily. 90 tablet 0    fluticasone (Flonase) 50 mcg/actuation nasal spray ADMINISTER 2 SPRAYS INTO EACH NOSTRIL ONCE DAILY. 16 mL 1    ibuprofen (Advil Liqui-GeL) capsule Take 2 capsules (400 mg) by mouth if needed.      metoprolol tartrate (Lopressor) 50 mg tablet TAKE 1 TABLET BY MOUTH TWICE A  tablet 1    omeprazole (PriLOSEC) 40 mg DR capsule Take 1 capsule (40 mg) by mouth if needed (Acid reflux). 90 capsule 1     No current facility-administered medications for this visit.

## 2025-06-04 DIAGNOSIS — J30.9 ALLERGIC RHINITIS, UNSPECIFIED SEASONALITY, UNSPECIFIED TRIGGER: ICD-10-CM

## 2025-06-04 RX ORDER — FLUTICASONE PROPIONATE 50 MCG
2 SPRAY, SUSPENSION (ML) NASAL DAILY
Qty: 16 ML | Refills: 1 | Status: SHIPPED | OUTPATIENT
Start: 2025-06-04

## 2025-06-07 DIAGNOSIS — I10 ESSENTIAL (PRIMARY) HYPERTENSION: ICD-10-CM

## 2025-06-09 RX ORDER — AMLODIPINE BESYLATE 10 MG/1
10 TABLET ORAL DAILY
Qty: 90 TABLET | Refills: 1 | Status: SHIPPED | OUTPATIENT
Start: 2025-06-09

## 2025-07-18 ENCOUNTER — RESULTS FOLLOW-UP (OUTPATIENT)
Dept: PRIMARY CARE | Facility: CLINIC | Age: 75
End: 2025-07-18
Payer: MEDICARE

## 2025-07-18 LAB
ALBUMIN SERPL-MCNC: 4.5 G/DL (ref 3.6–5.1)
ALP SERPL-CCNC: 90 U/L (ref 37–153)
ALT SERPL-CCNC: 11 U/L (ref 6–29)
ANION GAP SERPL CALCULATED.4IONS-SCNC: 8 MMOL/L (CALC) (ref 7–17)
AST SERPL-CCNC: 12 U/L (ref 10–35)
BASOPHILS # BLD AUTO: 42 CELLS/UL (ref 0–200)
BASOPHILS NFR BLD AUTO: 0.7 %
BILIRUB SERPL-MCNC: 0.6 MG/DL (ref 0.2–1.2)
BUN SERPL-MCNC: 20 MG/DL (ref 7–25)
CALCIUM SERPL-MCNC: 10 MG/DL (ref 8.6–10.4)
CHLORIDE SERPL-SCNC: 104 MMOL/L (ref 98–110)
CHOLEST SERPL-MCNC: 219 MG/DL
CHOLEST/HDLC SERPL: 3.3 (CALC)
CO2 SERPL-SCNC: 27 MMOL/L (ref 20–32)
CREAT SERPL-MCNC: 0.87 MG/DL (ref 0.6–1)
EGFRCR SERPLBLD CKD-EPI 2021: 70 ML/MIN/1.73M2
EOSINOPHIL # BLD AUTO: 102 CELLS/UL (ref 15–500)
EOSINOPHIL NFR BLD AUTO: 1.7 %
ERYTHROCYTE [DISTWIDTH] IN BLOOD BY AUTOMATED COUNT: 12.8 % (ref 11–15)
GLUCOSE SERPL-MCNC: 95 MG/DL (ref 65–99)
HCT VFR BLD AUTO: 42.1 % (ref 35–45)
HDLC SERPL-MCNC: 66 MG/DL
HGB BLD-MCNC: 13.9 G/DL (ref 11.7–15.5)
LDLC SERPL CALC-MCNC: 137 MG/DL (CALC)
LYMPHOCYTES # BLD AUTO: 2148 CELLS/UL (ref 850–3900)
LYMPHOCYTES NFR BLD AUTO: 35.8 %
MCH RBC QN AUTO: 31.9 PG (ref 27–33)
MCHC RBC AUTO-ENTMCNC: 33 G/DL (ref 32–36)
MCV RBC AUTO: 96.6 FL (ref 80–100)
MONOCYTES # BLD AUTO: 504 CELLS/UL (ref 200–950)
MONOCYTES NFR BLD AUTO: 8.4 %
NEUTROPHILS # BLD AUTO: 3204 CELLS/UL (ref 1500–7800)
NEUTROPHILS NFR BLD AUTO: 53.4 %
NONHDLC SERPL-MCNC: 153 MG/DL (CALC)
PLATELET # BLD AUTO: 186 THOUSAND/UL (ref 140–400)
PMV BLD REES-ECKER: 9.5 FL (ref 7.5–12.5)
POTASSIUM SERPL-SCNC: 4.1 MMOL/L (ref 3.5–5.3)
PROT SERPL-MCNC: 7.1 G/DL (ref 6.1–8.1)
RBC # BLD AUTO: 4.36 MILLION/UL (ref 3.8–5.1)
SODIUM SERPL-SCNC: 139 MMOL/L (ref 135–146)
TRIGL SERPL-MCNC: 67 MG/DL
TSH SERPL-ACNC: 2.72 MIU/L (ref 0.4–4.5)
WBC # BLD AUTO: 6 THOUSAND/UL (ref 3.8–10.8)

## 2025-07-18 NOTE — TELEPHONE ENCOUNTER
----- Message from Ruddy Hackett sent at 7/18/2025 10:19 AM EDT -----  Cholesterol elevated ,watch diet and exercise if possible  Others acceptable  ----- Message -----  From: Jill Nichols Results In  Sent: 7/17/2025   8:49 PM EDT  To: Ruddy Hackett MD

## 2025-08-03 DIAGNOSIS — J30.9 ALLERGIC RHINITIS, UNSPECIFIED SEASONALITY, UNSPECIFIED TRIGGER: ICD-10-CM

## 2025-08-04 RX ORDER — FLUTICASONE PROPIONATE 50 MCG
2 SPRAY, SUSPENSION (ML) NASAL DAILY
Qty: 16 ML | Refills: 1 | Status: SHIPPED | OUTPATIENT
Start: 2025-08-04

## 2025-09-03 DIAGNOSIS — I10 ESSENTIAL (PRIMARY) HYPERTENSION: ICD-10-CM

## 2025-09-06 RX ORDER — METOPROLOL TARTRATE 50 MG/1
50 TABLET ORAL 2 TIMES DAILY
Qty: 180 TABLET | Refills: 1 | Status: SHIPPED | OUTPATIENT
Start: 2025-09-06

## 2025-10-22 ENCOUNTER — APPOINTMENT (OUTPATIENT)
Dept: PRIMARY CARE | Facility: CLINIC | Age: 75
End: 2025-10-22
Payer: MEDICARE